# Patient Record
Sex: FEMALE | Race: ASIAN | NOT HISPANIC OR LATINO | Employment: UNEMPLOYED | URBAN - METROPOLITAN AREA
[De-identification: names, ages, dates, MRNs, and addresses within clinical notes are randomized per-mention and may not be internally consistent; named-entity substitution may affect disease eponyms.]

---

## 2017-01-03 ENCOUNTER — TELEPHONE (OUTPATIENT)
Dept: OBGYN | Facility: CLINIC | Age: 32
End: 2017-01-03

## 2017-01-03 NOTE — TELEPHONE ENCOUNTER
Patient called because she had her c section ans is experiencing vaginal bleeding since Sunday and does not know what to do

## 2017-01-03 NOTE — TELEPHONE ENCOUNTER
Called pt back   Pt had stopped bleeding and started bleeding   Advised pt that its normal. Advised pt if starts soaking through a pad every hour then she needs to go to the ER  Pt verbalized understanding

## 2017-01-04 ENCOUNTER — POST PARTUM (OUTPATIENT)
Dept: OBGYN | Facility: CLINIC | Age: 32
End: 2017-01-04
Payer: COMMERCIAL

## 2017-01-04 VITALS — WEIGHT: 177 LBS | SYSTOLIC BLOOD PRESSURE: 106 MMHG | DIASTOLIC BLOOD PRESSURE: 72 MMHG | BODY MASS INDEX: 28.58 KG/M2

## 2017-01-04 DIAGNOSIS — Z51.89 VISIT FOR WOUND CHECK: ICD-10-CM

## 2017-01-04 PROCEDURE — 90050 PR POSTPARTUM VISIT: CPT | Performed by: OBSTETRICS & GYNECOLOGY

## 2017-01-04 NOTE — MR AVS SNAPSHOT
Mihai Nazario Dhesi   2017 1:45 PM   Post Partum   MRN: [id number]    Department:  [facility]   Dept Phone:  [phone]    Description:  Female : 1985   Provider:  Alma Centeno M.D.           Reason for Visit     Postpartum care c/s check      Allergies as of 2017     No Known Allergies      Vital Signs     Blood Pressure Weight Breastfeeding? Smoking Status          106/72 mmHg 80.287 kg (177 lb) Yes Never Smoker         Basic Information     Date Of Birth Sex Race Ethnicity Preferred Language    1985 Female  Non- English      Health Maintenance        Date Due Completion Dates    PAP SMEAR 2019    IMM DTaP/Tdap/Td Vaccine (2 - Td) 2026            Current Immunizations     Influenza Vaccine Quad Inj (Pf) 2016 11:30 AM    MMR/Varicella Combined Vaccine  Incomplete    Tdap Vaccine  Incomplete, 2016 11:30 AM      Below and/or attached are the medications your provider expects you to take. Review all of your home medications and newly ordered medications with your provider and/or pharmacist. Follow medication instructions as directed by your provider and/or pharmacist. Please keep your medication list with you and share with your provider. Update the information when medications are discontinued, doses are changed, or new medications (including over-the-counter products) are added; and carry medication information at all times in the event of emergency situations     Allergies:  No Known Allergies          Medications  Valid as of: 2017 -  2:18 PM    Generic Name Brand Name Tablet Size Instructions for use    Docusate Sodium (Cap)  MG Take 100 mg by mouth 2 times a day as needed for Constipation.        Ferrous Sulfate (Tab) ferrous sulfate 325 (65 FE) MG Take 1 Tab by mouth every day.        Ibuprofen (Tab) MOTRIN 600 MG Take 1 Tab by mouth every 6 hours as needed (cramping).        Oxycodone-Acetaminophen (Tab)  PERCOCET 5-325 MG Take 1 Tab by mouth every four hours as needed for Moderate Pain ((Pain Scale 4-6)).        Prenatal MV-Min-Fe Fum-FA-DHA   Take  by mouth.        .                 Medicines prescribed today were sent to:     [id number] - [place], [place] - [address]    [address] [place] [address]    Phone: [phone] Fax: [phone]    Open 24 Hours?: No      Medication refill instructions:       If your prescription bottle indicates you have medication refills left, it is not necessary to call your provider’s office. Please contact your pharmacy and they will refill your medication.    If your prescription bottle indicates you do not have any refills left, you may request refills at any time through one of the following ways: The online Offerboard system (except Urgent Care), by calling your provider’s office, or by asking your pharmacy to contact your provider’s office with a refill request. Medication refills are processed only during regular business hours and may not be available until the next business day. Your provider may request additional information or to have a follow-up visit with you prior to refilling your medication.   *Please Note: Medication refills are assigned a new Rx number when refilled electronically. Your pharmacy may indicate that no refills were authorized even though a new prescription for the same medication is available at the pharmacy. Please request the medicine by name with the pharmacy before contacting your provider for a refill.           Offerboard Access Code: Activation code not generated  Current Offerboard Status: Active

## 2017-01-05 NOTE — PROGRESS NOTES
Mihai Brandt Is a 31 y.o.  status post  delivery on 16. Patient is here today for an incision check. Currently the patient is without complaints.  She reports Normal  BM.  Normal  appetite without nausea and vomiting. She denies fever. Pain is under good control.    /72 mmHg  Wt 80.287 kg (177 lb)  Breastfeeding? Yes  ABD soft, NT, ND.  Incision well healed, dry and intact      Assessment and plan  Status post  delivery  No complications incision healing well  Followup in 4 weeks for final postpartum checkup

## 2017-02-08 ENCOUNTER — POST PARTUM (OUTPATIENT)
Dept: OBGYN | Facility: CLINIC | Age: 32
End: 2017-02-08
Payer: COMMERCIAL

## 2017-02-08 VITALS — WEIGHT: 175 LBS | BODY MASS INDEX: 28.26 KG/M2 | SYSTOLIC BLOOD PRESSURE: 100 MMHG | DIASTOLIC BLOOD PRESSURE: 60 MMHG

## 2017-02-08 DIAGNOSIS — F41.9 ANXIETY: ICD-10-CM

## 2017-02-08 PROCEDURE — 90050 PR POSTPARTUM VISIT: CPT | Performed by: OBSTETRICS & GYNECOLOGY

## 2017-02-08 NOTE — MR AVS SNAPSHOT
Mihai Nazario Dhesi   2017 2:00 PM   Post Partum   MRN: [id number]    Department:  [facility]   Dept Phone:  [phone]    Description:  Female : 1985   Provider:  Alma Centeno M.D.           Reason for Visit     Postpartum care           Allergies as of 2017     No Known Allergies      Vital Signs     Blood Pressure Weight Breastfeeding? Smoking Status          100/60 mmHg 79.379 kg (175 lb) Yes Never Smoker         Basic Information     Date Of Birth Sex Race Ethnicity Preferred Language    1985 Female  Non- English      Health Maintenance        Date Due Completion Dates    PAP SMEAR 2019    IMM DTaP/Tdap/Td Vaccine (2 - Td) 2026            Current Immunizations     Influenza Vaccine Quad Inj (Pf) 2016 11:30 AM    MMR/Varicella Combined Vaccine  Incomplete    Tdap Vaccine  Incomplete, 2016 11:30 AM      Below and/or attached are the medications your provider expects you to take. Review all of your home medications and newly ordered medications with your provider and/or pharmacist. Follow medication instructions as directed by your provider and/or pharmacist. Please keep your medication list with you and share with your provider. Update the information when medications are discontinued, doses are changed, or new medications (including over-the-counter products) are added; and carry medication information at all times in the event of emergency situations     Allergies:  No Known Allergies          Medications  Valid as of: 2017 -  2:36 PM    Generic Name Brand Name Tablet Size Instructions for use    Docusate Sodium (Cap)  MG Take 100 mg by mouth 2 times a day as needed for Constipation.        Ferrous Sulfate (Tab) ferrous sulfate 325 (65 FE) MG Take 1 Tab by mouth every day.        Ibuprofen (Tab) MOTRIN 600 MG Take 1 Tab by mouth every 6 hours as needed (cramping).        Oxycodone-Acetaminophen (Tab)  PERCOCET 5-325 MG Take 1 Tab by mouth every four hours as needed for Moderate Pain ((Pain Scale 4-6)).        Prenatal MV-Min-Fe Fum-FA-DHA   Take  by mouth.        .                 Medicines prescribed today were sent to:     [facility] - [place], [place] - [address]    [address] [place] [address]    Phone: [phone] Fax: [phone]    Open 24 Hours?: No      Medication refill instructions:       If your prescription bottle indicates you have medication refills left, it is not necessary to call your provider’s office. Please contact your pharmacy and they will refill your medication.    If your prescription bottle indicates you do not have any refills left, you may request refills at any time through one of the following ways: The online TappTime system (except Urgent Care), by calling your provider’s office, or by asking your pharmacy to contact your provider’s office with a refill request. Medication refills are processed only during regular business hours and may not be available until the next business day. Your provider may request additional information or to have a follow-up visit with you prior to refilling your medication.   *Please Note: Medication refills are assigned a new Rx number when refilled electronically. Your pharmacy may indicate that no refills were authorized even though a new prescription for the same medication is available at the pharmacy. Please request the medicine by name with the pharmacy before contacting your provider for a refill.           TappTime Access Code: Activation code not generated  Current TappTime Status: Active

## 2017-02-08 NOTE — PROGRESS NOTES
Mihai Brandt Is a 31 y.o.  status post  delivery on 16. Patient is here today for a postpartum check. Currently the patient c/o some intermittent abdominal pain. Vaginal bleeding has stopped. She also c/o significant anxiety and mood swings.  No suicidal or homicidal ideations.  Breast feeding. She reports Normal  BM.  Normal  appetite without nausea and vomiting. She denies fever. Pain is under good control with motrin.     /60 mmHg  Wt 79.379 kg (175 lb)  Breastfeeding? Yes GENERAL: Alert, in no apparent distress  Appropriate affect, intact insight and judgement.  Appears anxious, almost tearful.  ABDOMEN: Soft, nontender, nondistended.  No palpable masses.  No rebound or guarding.  No inguinal lymphadenopathy.  INCISION - well healed.     GENITOURINARY:  Normal external genitalia, no lesions.  Normal anus and perineum.    Speculum exam attempted - pt states she was in pain as soon as I touched her and asked me to stop exam.     Assessment and plan  Status post  delivery  No complications incision healing well  Anxiety - Zoloft 50 mg qd.  Advised minimal effects on breast feeding.    F/U 1 month.

## 2018-05-13 ENCOUNTER — OFFICE VISIT (OUTPATIENT)
Dept: URGENT CARE | Facility: CLINIC | Age: 33
End: 2018-05-13
Payer: COMMERCIAL

## 2018-05-13 VITALS
RESPIRATION RATE: 16 BRPM | HEIGHT: 66 IN | DIASTOLIC BLOOD PRESSURE: 66 MMHG | HEART RATE: 96 BPM | SYSTOLIC BLOOD PRESSURE: 100 MMHG | OXYGEN SATURATION: 99 % | BODY MASS INDEX: 22.02 KG/M2 | TEMPERATURE: 99 F | WEIGHT: 137 LBS

## 2018-05-13 DIAGNOSIS — J02.0 STREP PHARYNGITIS: ICD-10-CM

## 2018-05-13 LAB
INT CON NEG: NORMAL
INT CON POS: NORMAL
S PYO AG THROAT QL: POSITIVE

## 2018-05-13 PROCEDURE — 99204 OFFICE O/P NEW MOD 45 MIN: CPT | Performed by: PHYSICIAN ASSISTANT

## 2018-05-13 PROCEDURE — 87880 STREP A ASSAY W/OPTIC: CPT | Performed by: PHYSICIAN ASSISTANT

## 2018-05-13 RX ORDER — AMOXICILLIN 875 MG/1
875 TABLET, COATED ORAL 2 TIMES DAILY
Qty: 20 TAB | Refills: 0 | Status: SHIPPED | OUTPATIENT
Start: 2018-05-13 | End: 2018-05-23

## 2018-05-13 RX ORDER — DIPHENHYDRAMINE HYDROCHLORIDE AND LIDOCAINE HYDROCHLORIDE AND ALUMINUM HYDROXIDE AND MAGNESIUM HYDRO
5 KIT EVERY 6 HOURS PRN
Qty: 100 ML | Refills: 0 | Status: SHIPPED | OUTPATIENT
Start: 2018-05-13 | End: 2019-01-04

## 2018-05-13 RX ORDER — ACETAMINOPHEN 500 MG
500-1000 TABLET ORAL EVERY 6 HOURS PRN
COMMUNITY
End: 2019-01-04

## 2018-05-13 ASSESSMENT — ENCOUNTER SYMPTOMS
NECK PAIN: 1
DIARRHEA: 0
TINGLING: 0
SPUTUM PRODUCTION: 0
MYALGIAS: 1
PALPITATIONS: 0
COUGH: 0
SORE THROAT: 1
SWOLLEN GLANDS: 1
WHEEZING: 0
CHILLS: 1
HEADACHES: 1
VOMITING: 0
NAUSEA: 0
SENSORY CHANGE: 0
FEVER: 1
FOCAL WEAKNESS: 0
SHORTNESS OF BREATH: 0
SINUS PAIN: 0
ABDOMINAL PAIN: 0

## 2018-05-13 NOTE — PROGRESS NOTES
Subjective:      Mihai Brandt is a 32 y.o. female who presents with Pharyngitis (x4 days. Sore throat, fever, body aches, neck pain. )            Pharyngitis    This is a new problem. Episode onset: 2 days. The problem has been unchanged. Maximum temperature: subjective  Associated symptoms include headaches, neck pain and swollen glands. Pertinent negatives include no abdominal pain, congestion, coughing, diarrhea, drooling, ear discharge, ear pain, plugged ear sensation, shortness of breath or vomiting. She has tried acetaminophen for the symptoms. The treatment provided mild relief.     Past Medical History:   Diagnosis Date   • Anesthesia     N/V   • Indigestion    • [place] product of in vitro fertilization (IVF) pregnancy        • Pain     lower back pain   • PCOD (polycystic ovarian disease)     HAD LAPROSCOPIC SURGERY    • Pregnant        Past Surgical History:   Procedure Laterality Date   • REPEAT C SECTION  2016    Procedure: REPEAT C SECTION;  Surgeon: Alma Centeno M.D.;  Location: LABOR AND DELIVERY;  Service:    • PRIMARY C SECTION      2015       Family History   Problem Relation Age of Onset   • Heart Disease Father    • Hypertension Father        No Known Allergies    Medications, Allergies, and current problem list reviewed today in Epic      Review of Systems   Constitutional: Positive for chills, fever and malaise/fatigue.   HENT: Positive for sore throat. Negative for congestion, drooling, ear discharge, ear pain and sinus pain.    Respiratory: Negative for cough, sputum production, shortness of breath and wheezing.    Cardiovascular: Negative for chest pain, palpitations and leg swelling.   Gastrointestinal: Negative for abdominal pain, diarrhea, nausea and vomiting.   Musculoskeletal: Positive for myalgias and neck pain.   Skin: Negative for rash.   Neurological: Positive for headaches. Negative for tingling, sensory change and focal weakness.     All other systems  "reviewed and are negative.          Objective:     /66   Pulse 96   Temp 37.2 °C (99 °F)   Resp 16   Ht 1.676 m (5' 6\")   Wt 62.1 kg (137 lb)   SpO2 99%   Breastfeeding? No   BMI 22.11 kg/m²      Physical Exam   Constitutional: She is oriented to person, place, and time. She appears well-developed and well-nourished. No distress.   HENT:   Head: Normocephalic and atraumatic.   Right Ear: Tympanic membrane, external ear and ear canal normal.   Left Ear: Tympanic membrane, external ear and ear canal normal.   Nose: Nose normal.   Mouth/Throat: Uvula is midline. Posterior oropharyngeal erythema present. No oropharyngeal exudate. Tonsils are 1+ on the right. Tonsils are 1+ on the left. Tonsillar exudate.   Eyes: Conjunctivae are normal.   Neck: Neck supple.   Cardiovascular: Normal rate, regular rhythm and normal heart sounds.  Exam reveals no gallop and no friction rub.    No murmur heard.  Pulmonary/Chest: Effort normal and breath sounds normal. No respiratory distress. She has no wheezes. She has no rales.   Lymphadenopathy:     She has cervical adenopathy (mild cervical adenopathy bilaterally ).   Neurological: She is alert and oriented to person, place, and time. No cranial nerve deficit.   Skin: Skin is warm and dry. No rash noted.   Psychiatric: She has a normal mood and affect. Her behavior is normal. Judgment and thought content normal.               Assessment/Plan:     1. Strep pharyngitis  POCT Rapid Strep A- positive     amoxicillin (AMOXIL) 875 MG tablet    DPH-Lido-AlHydr-MgHydr-Simeth (MAGIC MOUTHWASH BLM) Suspension         Current Outpatient Prescriptions:     •  amoxicillin (AMOXIL) 875 MG tablet, Take 1 Tab by mouth 2 times a day for 10 days., Disp: 20 Tab, Rfl: 0  •  DPH-Lido-AlHydr-MgHydr-Simeth (MAGIC MOUTHWASH BLM) Suspension, Take 5 mL by mouth every 6 hours as needed. 1:1:1 ratio. Swish and SPIT. KIT is OK if that is how the pharmacy dispenses this medication, Disp: 100 mL, Rfl: " 0     Differential diagnoses, Supportive care, and indications for immediate follow-up discussed with patient.   Instructed to return to clinic or nearest emergency department for any change in condition, further concerns, or worsening of symptoms.    The patient demonstrated a good understanding and agreed with the treatment plan.    Karla Chandra P.A.-C.

## 2018-06-07 ENCOUNTER — OFFICE VISIT (OUTPATIENT)
Dept: URGENT CARE | Facility: CLINIC | Age: 33
End: 2018-06-07
Payer: COMMERCIAL

## 2018-06-07 VITALS
DIASTOLIC BLOOD PRESSURE: 82 MMHG | WEIGHT: 137 LBS | HEART RATE: 75 BPM | BODY MASS INDEX: 22.02 KG/M2 | RESPIRATION RATE: 16 BRPM | HEIGHT: 66 IN | OXYGEN SATURATION: 100 % | TEMPERATURE: 98.5 F | SYSTOLIC BLOOD PRESSURE: 128 MMHG

## 2018-06-07 DIAGNOSIS — R30.0 DYSURIA: Primary | ICD-10-CM

## 2018-06-07 LAB
APPEARANCE UR: NORMAL
BILIRUB UR STRIP-MCNC: NORMAL MG/DL
COLOR UR AUTO: YELLOW
GLUCOSE UR STRIP.AUTO-MCNC: NORMAL MG/DL
KETONES UR STRIP.AUTO-MCNC: NORMAL MG/DL
LEUKOCYTE ESTERASE UR QL STRIP.AUTO: NORMAL
NITRITE UR QL STRIP.AUTO: NORMAL
PH UR STRIP.AUTO: 6 [PH] (ref 5–8)
PROT UR QL STRIP: NORMAL MG/DL
RBC UR QL AUTO: NORMAL
SP GR UR STRIP.AUTO: 1.03
UROBILINOGEN UR STRIP-MCNC: NORMAL MG/DL

## 2018-06-07 PROCEDURE — 81002 URINALYSIS NONAUTO W/O SCOPE: CPT | Performed by: PHYSICIAN ASSISTANT

## 2018-06-07 PROCEDURE — 99213 OFFICE O/P EST LOW 20 MIN: CPT | Performed by: PHYSICIAN ASSISTANT

## 2018-06-07 RX ORDER — LEVOTHYROXINE SODIUM 88 UG/1
88 TABLET ORAL
COMMUNITY
End: 2019-07-29 | Stop reason: SDUPTHER

## 2018-06-07 RX ORDER — PHENAZOPYRIDINE HYDROCHLORIDE 200 MG/1
200 TABLET, FILM COATED ORAL 3 TIMES DAILY
Qty: 6 TAB | Refills: 0 | Status: SHIPPED | OUTPATIENT
Start: 2018-06-07 | End: 2018-06-09

## 2018-06-07 RX ORDER — UBIDECARENONE 75 MG
100 CAPSULE ORAL DAILY
COMMUNITY
End: 2019-10-22

## 2018-06-07 RX ORDER — NITROFURANTOIN 25; 75 MG/1; MG/1
100 CAPSULE ORAL EVERY 12 HOURS
Qty: 10 CAP | Refills: 0 | Status: SHIPPED | OUTPATIENT
Start: 2018-06-07 | End: 2018-06-12

## 2018-06-07 ASSESSMENT — PATIENT HEALTH QUESTIONNAIRE - PHQ9: CLINICAL INTERPRETATION OF PHQ2 SCORE: 0

## 2018-06-07 NOTE — PROGRESS NOTES
Subjective:      PT is a 32 y.o. female who presents with Dysuria (urine cloudy, pressure, frequency, x5days, past 2days hard to sleep)            HPI  PT comes into the UC with a chief complaint of dysuria, burning on urination, urgency, frequency, and bladder pressure and has not noticed blood in her urine x 2 days. PT denies fevers or chills, CP, SOB, NVD, paresthesias, headaches, dizziness, change in vision, hives, or joint pain. PT states the pain is a 5/10 with burning upon urination, aching in nature and worse at night. She states she has not taken any RX meds for this issue. She denies flank or back pain as well. The pt's medication list, problem list, and allergies have been evaluated and reviewed during today's visit.        PMH:  Past Medical History:   Diagnosis Date   • Anesthesia     N/V   • Indigestion    • [place] product of in vitro fertilization (IVF) pregnancy        • Pain     lower back pain   • PCOD (polycystic ovarian disease)     HAD LAPROSCOPIC SURGERY    • Pregnant        PSH:  Past Surgical History:   Procedure Laterality Date   • REPEAT C SECTION  2016    Procedure: REPEAT C SECTION;  Surgeon: Alma Centeno M.D.;  Location: LABOR AND DELIVERY;  Service:    • PRIMARY C SECTION      2015       Fam Hx:    family history includes Heart Disease in her father; Hypertension in her father.  Family Status   Relation Status   • Mother Alive   • Father Alive       Soc HX:  Social History     Social History   • Marital status:      Spouse name: N/A   • Number of children: N/A   • Years of education: N/A     Occupational History   • Not on file.     Social History Main Topics   • Smoking status: Never Smoker   • Smokeless tobacco: Never Used   • Alcohol use No   • Drug use: No   • Sexual activity: Yes     Partners: Male     Other Topics Concern   • Not on file     Social History Narrative   • No narrative on file         Medications:    Current Outpatient Prescriptions:   •   nitrofurantoin monohydr macro (MACROBID) 100 MG Cap, Take 1 Cap by mouth every 12 hours for 5 days., Disp: 10 Cap, Rfl: 0  •  phenazopyridine (PYRIDIUM) 200 MG Tab, Take 1 Tab by mouth 3 times a day for 2 days., Disp: 6 Tab, Rfl: 0  •  acetaminophen (TYLENOL) 500 MG Tab, Take 500-1,000 mg by mouth every 6 hours as needed., Disp: , Rfl:   •  DPH-Lido-AlHydr-MgHydr-Simeth (MAGIC MOUTHWASH BLM) Suspension, Take 5 mL by mouth every 6 hours as needed. 1:1:1 ratio. Swish and SPIT. KIT is OK if that is how the pharmacy dispenses this medication, Disp: 100 mL, Rfl: 0  •  sertraline (ZOLOFT) 50 MG Tab, Take 1 Tab by mouth every day., Disp: 30 Tab, Rfl: 1  •  oxycodone-acetaminophen (PERCOCET) 5-325 MG Tab, Take 1 Tab by mouth every four hours as needed for Moderate Pain ((Pain Scale 4-6))., Disp: 30 Tab, Rfl: 0  •  ibuprofen (MOTRIN) 600 MG Tab, Take 1 Tab by mouth every 6 hours as needed (cramping)., Disp: 30 Tab, Rfl: 2  •  docusate sodium 100 MG Cap, Take 100 mg by mouth 2 times a day as needed for Constipation., Disp: 60 Cap, Rfl: 2  •  ferrous sulfate 325 (65 FE) MG tablet, Take 1 Tab by mouth every day., Disp: 30 Tab, Rfl: 2  •  Prenatal MV-Min-Fe Fum-FA-DHA (PRENATAL 1 PO), Take  by mouth., Disp: , Rfl:       Allergies:  Patient has no known allergies.    ROS  Review of Systems   Constitutional: Negative for fever, chills and malaise/fatigue.   HENT: Negative for congestion and sore throat.    Eyes: Negative for blurred vision, double vision and photophobia.   Respiratory: Negative for cough and shortness of breath.    Cardiovascular: Negative for chest pain and palpitations.   Gastrointestinal: Negative for nausea, vomiting, abdominal pain, diarrhea and constipation.   Genitourinary: Positive for dysuria, urgency and frequency.   Musculoskeletal: Negative for joint pain and myalgias.   Skin: Negative for rash.   Neurological: Negative for dizziness, tingling and headaches.   Endo/Heme/Allergies: Does not bruise/bleed  "easily.   Psychiatric/Behavioral: Negative for depression. The patient is not nervous/anxious.           Objective:   Encounter Vitals  Standard Vitals  Vitals  Blood Pressure: 128/82  Temperature: 36.9 °C (98.5 °F)  Pulse: 75  Respiration: 16  Pulse Oximetry: 100 %  Height: 167.6 cm (5' 6\")  Weight: 62.1 kg (137 lb)      Physical Exam      Physical Exam   Constitutional: She is oriented to person, place, and time. She appears well-developed and well-nourished. No distress.   HENT:   Head: Normocephalic and atraumatic.   Right Ear: External ear normal.   Left Ear: External ear normal.   Nose: Nose normal.   Mouth/Throat: Oropharynx is clear and moist. No oropharyngeal exudate.   Eyes: Conjunctivae normal and EOM are normal. Pupils are equal, round, and reactive to light.   Neck: Normal range of motion. Neck supple. No thyromegaly present.   Cardiovascular: Normal rate, regular rhythm, normal heart sounds and intact distal pulses.  Exam reveals no gallop and no friction rub.    No murmur heard.  Pulmonary/Chest: Effort normal and breath sounds normal. No respiratory distress. She has no wheezes. She has no rales. She exhibits no tenderness.   Abdominal: Soft. Bowel sounds are normal. She exhibits no distension and no mass. There is no tenderness. There is no rebound and no guarding.   Genitourinary:        Pt deferred   Musculoskeletal: Normal range of motion. She exhibits no edema and no tenderness.   Lymphadenopathy:     She has no cervical adenopathy.   Neurological: She is alert and oriented to person, place, and time. She has normal reflexes. No cranial nerve deficit.   Skin: Skin is warm and dry. No rash noted. No erythema.   Psychiatric: She has a normal mood and affect. Her behavior is normal. Judgment and thought content normal.          Assessment/Plan:     1. Dysuria    - POCT Urinalysis-->LEUKS AND BLOOD  - Urine Culture; Future  - nitrofurantoin monohydr macro (MACROBID) 100 MG Cap; Take 1 Cap by mouth " every 12 hours for 5 days.  Dispense: 10 Cap; Refill: 0  - phenazopyridine (PYRIDIUM) 200 MG Tab; Take 1 Tab by mouth 3 times a day for 2 days.  Dispense: 6 Tab; Refill: 0    Rest, fluids encouraged.  AVS with medical info given.  Pt was in full understanding and agreement with the plan.  Follow-up as needed if symptoms worsen or fail to improve.

## 2018-06-12 ENCOUNTER — TELEPHONE (OUTPATIENT)
Dept: URGENT CARE | Facility: CLINIC | Age: 33
End: 2018-06-12

## 2018-06-12 NOTE — TELEPHONE ENCOUNTER
Patient states that she has finished her antibiotics and is not feeling better. She states that she needs a higher dose of antibiotics every times she gets put on any and would like just like a higher dose. Has been informed to come in be reevaluated but she would like you to send something in for her instead.

## 2018-06-19 ENCOUNTER — TELEPHONE (OUTPATIENT)
Dept: URGENT CARE | Facility: CLINIC | Age: 33
End: 2018-06-19

## 2018-06-19 DIAGNOSIS — N30.01 ACUTE CYSTITIS WITH HEMATURIA: Primary | ICD-10-CM

## 2018-06-19 RX ORDER — CIPROFLOXACIN 500 MG/1
500 TABLET, FILM COATED ORAL 2 TIMES DAILY
Qty: 14 TAB | Refills: 0 | Status: SHIPPED | OUTPATIENT
Start: 2018-06-19 | End: 2018-06-26

## 2018-06-19 NOTE — TELEPHONE ENCOUNTER
Called and left message with pt about urine culture results which came back positive for E.coli.   Pt notes she is still having symptoms. I called in Cipro for the pt to her pharmacy.  Encouraged Pt to call back with questions.  Jas Landin PA-C

## 2018-11-19 ENCOUNTER — OFFICE VISIT (OUTPATIENT)
Dept: URGENT CARE | Facility: CLINIC | Age: 33
End: 2018-11-19
Payer: COMMERCIAL

## 2018-11-19 ENCOUNTER — HOSPITAL ENCOUNTER (OUTPATIENT)
Facility: MEDICAL CENTER | Age: 33
End: 2018-11-19
Attending: PHYSICIAN ASSISTANT
Payer: COMMERCIAL

## 2018-11-19 VITALS
HEIGHT: 66 IN | HEART RATE: 78 BPM | TEMPERATURE: 98.8 F | SYSTOLIC BLOOD PRESSURE: 124 MMHG | DIASTOLIC BLOOD PRESSURE: 80 MMHG | BODY MASS INDEX: 21.86 KG/M2 | OXYGEN SATURATION: 99 % | WEIGHT: 136 LBS

## 2018-11-19 DIAGNOSIS — N30.00 ACUTE CYSTITIS WITHOUT HEMATURIA: ICD-10-CM

## 2018-11-19 LAB
APPEARANCE UR: NORMAL
BILIRUB UR STRIP-MCNC: NEGATIVE MG/DL
COLOR UR AUTO: NORMAL
FORWARD REASON: SPWHY: NORMAL
FORWARDED TO LAB: SPWHR: NORMAL
GLUCOSE UR STRIP.AUTO-MCNC: NEGATIVE MG/DL
INT CON NEG: NORMAL
INT CON POS: NORMAL
KETONES UR STRIP.AUTO-MCNC: NEGATIVE MG/DL
LEUKOCYTE ESTERASE UR QL STRIP.AUTO: NORMAL
NITRITE UR QL STRIP.AUTO: NEGATIVE
PH UR STRIP.AUTO: 5 [PH] (ref 5–8)
POC URINE PREGNANCY TEST: NEGATIVE
PROT UR QL STRIP: NEGATIVE MG/DL
RBC UR QL AUTO: NORMAL
SP GR UR STRIP.AUTO: 1.02
SPECIMEN SENT: SPWT1: NORMAL
UROBILINOGEN UR STRIP-MCNC: 0.2 MG/DL

## 2018-11-19 PROCEDURE — 81025 URINE PREGNANCY TEST: CPT | Performed by: PHYSICIAN ASSISTANT

## 2018-11-19 PROCEDURE — 81002 URINALYSIS NONAUTO W/O SCOPE: CPT | Performed by: PHYSICIAN ASSISTANT

## 2018-11-19 PROCEDURE — 99214 OFFICE O/P EST MOD 30 MIN: CPT | Performed by: PHYSICIAN ASSISTANT

## 2018-11-19 RX ORDER — ACETAMINOPHEN 325 MG/1
650 TABLET ORAL
COMMUNITY
End: 2019-01-04

## 2018-11-19 RX ORDER — CIPROFLOXACIN 500 MG/1
500 TABLET, FILM COATED ORAL EVERY 12 HOURS
Qty: 14 TAB | Refills: 0 | Status: SHIPPED | OUTPATIENT
Start: 2018-11-19 | End: 2018-11-26

## 2018-11-19 ASSESSMENT — ENCOUNTER SYMPTOMS
COUGH: 0
DIARRHEA: 0
HEADACHES: 0
FEVER: 0
VOMITING: 0
CHILLS: 0
ABDOMINAL PAIN: 0
NAUSEA: 0
BACK PAIN: 0
FLANK PAIN: 0
SHORTNESS OF BREATH: 0
PALPITATIONS: 0

## 2018-11-19 NOTE — PROGRESS NOTES
Subjective:      Miahi Brandt is a 33 y.o. female who presents with UTI and Dysuria            Dysuria    This is a new problem. Episode onset: 3 days. The problem occurs every urination. The problem has been unchanged. The quality of the pain is described as burning. There has been no fever. She is sexually active. There is no history of pyelonephritis. Associated symptoms include frequency and urgency. Pertinent negatives include no chills, discharge, flank pain, hematuria, nausea, possible pregnancy or vomiting. She has tried increased fluids for the symptoms. Her past medical history is significant for recurrent UTIs (3rd episode this year). There is no history of kidney stones.       Past Medical History:   Diagnosis Date   • Anesthesia     N/V   • Indigestion    •  product of in vitro fertilization (IVF) pregnancy        • Pain     lower back pain   • PCOD (polycystic ovarian disease)     HAD LAPROSCOPIC SURGERY    • Pregnant        Past Surgical History:   Procedure Laterality Date   • REPEAT C SECTION  2016    Procedure: REPEAT C SECTION;  Surgeon: Alma Centeno M.D.;  Location: LABOR AND DELIVERY;  Service:    • PRIMARY C SECTION      2015       Family History   Problem Relation Age of Onset   • Heart Disease Father    • Hypertension Father        No Known Allergies    Medications, Allergies, and current problem list reviewed today in Epic      Review of Systems   Constitutional: Negative for chills, fever and malaise/fatigue.   Respiratory: Negative for cough and shortness of breath.    Cardiovascular: Negative for chest pain, palpitations and leg swelling.   Gastrointestinal: Negative for abdominal pain, diarrhea, nausea and vomiting.   Genitourinary: Positive for dysuria, frequency and urgency. Negative for flank pain and hematuria.   Musculoskeletal: Negative for back pain.   Skin: Negative for rash.   Neurological: Negative for headaches.     All other systems reviewed and  "are negative.        Objective:     /80 (BP Location: Right arm, Patient Position: Sitting, BP Cuff Size: Adult)   Pulse 78   Temp 37.1 °C (98.8 °F) (Temporal)   Ht 1.676 m (5' 6\")   Wt 61.7 kg (136 lb)   SpO2 99%   BMI 21.95 kg/m²      Physical Exam   Constitutional: She is oriented to person, place, and time. She appears well-developed and well-nourished. No distress.   HENT:   Head: Normocephalic and atraumatic.   Eyes: Conjunctivae are normal.   Cardiovascular: Normal rate, regular rhythm and normal heart sounds.  Exam reveals no gallop and no friction rub.    No murmur heard.  Pulmonary/Chest: Effort normal and breath sounds normal. No respiratory distress. She has no wheezes. She has no rales.   Abdominal: Soft. She exhibits no distension and no mass. There is no hepatosplenomegaly. There is tenderness (mild suprapubic TTP ). There is no rigidity, no rebound, no guarding and no CVA tenderness.   Neurological: She is alert and oriented to person, place, and time. No cranial nerve deficit.   Skin: Skin is warm and dry. No rash noted.   Psychiatric: She has a normal mood and affect. Her behavior is normal. Judgment and thought content normal.               Lab Results   Component Value Date/Time    POCCOLOR dark yellow 11/19/2018 11:34 AM    POCCOLOR Yellow 09/03/2016 01:24 PM    POCAPPEAR Cloudy 11/19/2018 11:34 AM    POCAPPEAR Clear 09/03/2016 01:24 PM    POCLEUKEST Large 11/19/2018 11:34 AM    POCLEUKEST Negative 09/03/2016 01:24 PM    POCNITRITE negative 11/19/2018 11:34 AM    POCNITRITE Negative 09/03/2016 01:24 PM    POCUROBILIGE 0.2 11/19/2018 11:34 AM    POCPROTEIN negative 11/19/2018 11:34 AM    POCPROTEIN Negative 09/03/2016 01:24 PM    POCURPH 5.0 11/19/2018 11:34 AM    POCURPH 7.0 09/03/2016 01:24 PM    POCBLOOD trace 11/19/2018 11:34 AM    POCBLOOD Negative 09/03/2016 01:24 PM    POCSPGRV 1.025 11/19/2018 11:34 AM    POCSPGRV 1.020 09/03/2016 01:24 PM    POCKETONES negative 11/19/2018 " 11:34 AM    POCKETONES Negative 09/03/2016 01:24 PM    POCBILIRUBIN negative 11/19/2018 11:34 AM    POCGLUCUA negative 11/19/2018 11:34 AM    POCGLUCUA Negative 09/03/2016 01:24 PM        poct pregnancy- negative    Assessment/Plan:     1. Acute cystitis without hematuria  POCT Urinalysis    POCT PREGNANCY    Urine Culture    ciprofloxacin (CIPRO) 500 MG Tab         Patient's last UTI did not resolve with Macrobid. Patient is requesting another RX for Cipro.    Current Outpatient Prescriptions:   •  ciprofloxacin (CIPRO) 500 MG Tab, Take 1 Tab by mouth every 12 hours for 7 days., Disp: 14 Tab, Rfl: 0       Differential diagnoses, Supportive care, and indications for immediate follow-up discussed with patient.   Instructed to return to clinic or nearest emergency department for any change in condition, further concerns, or worsening of symptoms.    The patient demonstrated a good understanding and agreed with the treatment plan.    Karla Chandra P.A.-C.

## 2019-01-04 ENCOUNTER — OFFICE VISIT (OUTPATIENT)
Dept: INTERNAL MEDICINE | Facility: MEDICAL CENTER | Age: 34
End: 2019-01-04
Payer: COMMERCIAL

## 2019-01-04 VITALS
DIASTOLIC BLOOD PRESSURE: 66 MMHG | OXYGEN SATURATION: 98 % | SYSTOLIC BLOOD PRESSURE: 106 MMHG | WEIGHT: 134 LBS | HEIGHT: 65 IN | BODY MASS INDEX: 22.33 KG/M2 | HEART RATE: 82 BPM | TEMPERATURE: 98.8 F

## 2019-01-04 DIAGNOSIS — Z00.00 HEALTH CARE MAINTENANCE: ICD-10-CM

## 2019-01-04 DIAGNOSIS — Z87.42 HISTORY OF PCOS: ICD-10-CM

## 2019-01-04 DIAGNOSIS — E03.9 HYPOTHYROIDISM, UNSPECIFIED TYPE: ICD-10-CM

## 2019-01-04 DIAGNOSIS — R53.83 FATIGUE, UNSPECIFIED TYPE: ICD-10-CM

## 2019-01-04 PROCEDURE — 99204 OFFICE O/P NEW MOD 45 MIN: CPT | Mod: GC | Performed by: INTERNAL MEDICINE

## 2019-01-04 ASSESSMENT — PAIN SCALES - GENERAL: PAINLEVEL: NO PAIN

## 2019-01-04 ASSESSMENT — PATIENT HEALTH QUESTIONNAIRE - PHQ9: CLINICAL INTERPRETATION OF PHQ2 SCORE: 0

## 2019-01-04 NOTE — PATIENT INSTRUCTIONS
Vitamin B12 Deficiency  Vitamin B12 deficiency occurs when the body does not have enough vitamin B12. Vitamin B12 is an important vitamin. The body needs vitamin B12:  · To make red blood cells.  · To make DNA. This is the genetic material inside cells.  · To help the nerves work properly so they can carry messages from the brain to the body.  Vitamin B12 deficiency can cause various health problems, such as a low red blood cell count (anemia) or nerve damage.  What are the causes?  This condition may be caused by:  · Not eating enough foods that contain vitamin B12.  · Not having enough stomach acid and digestive fluids to properly absorb vitamin B12 from the food that you eat.  · Certain digestive system diseases that make it hard to absorb vitamin B12. These diseases include Crohn disease, chronic pancreatitis, and cystic fibrosis.  · Pernicious anemia. This is a condition in which the body does not make enough of a protein (intrinsic factor), resulting in too few red blood cells.  · Having a surgery in which part of the stomach or small intestine is removed.  · Taking certain medicines that make it hard for the body to absorb vitamin B12. These medicines include:  ¨ Heartburn medicine (antacids and proton pump inhibitors).  ¨ An antibiotic medicine called neomycin.  ¨ Some medicines that are used to treat diabetes, tuberculosis, gout, or high cholesterol.  What increases the risk?  The following factors may make you more likely to develop a B12 deficiency:  · Being older than age 50.  · Eating a vegetarian or vegan diet, especially while you are pregnant.  · Eating a poor diet while you are pregnant.  · Taking certain drugs.  · Having alcoholism.  What are the signs or symptoms?  In some cases, there are no symptoms of this condition. If the condition leads to anemia or nerve damage, various symptoms can occur, such as:  · Weakness.  · Fatigue.  · Loss of appetite.  · Weight loss.  · Numbness or tingling in your  hands and feet.  · Redness and burning of the tongue.  · Confusion or memory problems.  · Depression.  · Sensory problems, such as color blindness, ringing in the ears, or loss of taste.  · Diarrhea or constipation.  · Trouble walking.  If anemia is severe, symptoms can include:  · Shortness of breath.  · Dizziness.  · Rapid heart rate (tachycardia).  How is this diagnosed?  This condition may be diagnosed with a blood test to measure the level of vitamin B12 in your blood. You may have other tests to help find the cause of your vitamin B12 deficiency. These tests may include:  · A complete blood count (CBC). This is a group of tests that measure certain characteristics of blood cells.  · A blood test to measure intrinsic factor.  · An endoscopy. In this procedure, a thin tube with a camera on the end is used to look into your stomach or intestines.  How is this treated?  Treatment for this condition depends on the cause. Common treatment options include:  · Changing your eating and drinking habits, such as:  ¨ Eating more foods that contain vitamin B12.  ¨ Drinking less alcohol or no alcohol.  · Taking vitamin B12 supplements. Your health care provider will tell you which dosage is best for you.  · Getting vitamin B12 injections.  Follow these instructions at home:  · Take supplements only as told by your health care provider. Follow the directions carefully.  · Get any injections that are prescribed by your health care provider.  Do not miss your appointments.  · Eat lots of healthy foods that contain vitamin B12. Ask your health care provider if you should work with a dietitian. Foods that contain vitamin B12 include:  ¨ Meat.  ¨ Meat from birds (poultry).  ¨ Fish.  ¨ Eggs.  ¨ Cereal and dairy products that are fortified. This means that vitamin B12 has been added to the food. Check the label on the package to see if the food is fortified.  · Do not abuse alcohol.  · Keep all follow-up visits as told by your  health care provider. This is important.  Contact a health care provider if:  · Your symptoms come back.  Get help right away if:  · You develop shortness of breath.  · You have chest pain.  · You become dizzy or you lose consciousness.  This information is not intended to replace advice given to you by your health care provider. Make sure you discuss any questions you have with your health care provider.  Document Released: 03/11/2013 Document Revised: 05/31/2017 Document Reviewed: 05/04/2016  [employer] Interactive Patient Education © 2017 [employer]  Hypothyroidism  Hypothyroidism is a disorder of the thyroid. The thyroid is a large gland that is located in the lower front of the neck. The thyroid releases hormones that control how the body works. With hypothyroidism, the thyroid does not make enough of these hormones.  What are the causes?  Causes of hypothyroidism may include:  · Viral infections.  · Pregnancy.  · Your own defense system (immune system) attacking your thyroid.  · Certain medicines.  · Birth defects.  · Past radiation treatments to your head or neck.  · Past treatment with radioactive iodine.  · Past surgical removal of part or all of your thyroid.  · Problems with the gland that is located in the center of your brain (pituitary).  What are the signs or symptoms?  Signs and symptoms of hypothyroidism may include:  · Feeling as though you have no energy (lethargy).  · Inability to tolerate cold.  · Weight gain that is not explained by a change in diet or exercise habits.  · Dry skin.  · Coarse hair.  · Menstrual irregularity.  · Slowing of thought processes.  · Constipation.  · Sadness or depression.  How is this diagnosed?  Your health care provider may diagnose hypothyroidism with blood tests and ultrasound tests.  How is this treated?  Hypothyroidism is treated with medicine that replaces the hormones that your body does not make. After you begin treatment, it may take several weeks for  symptoms to go away.  Follow these instructions at home:  · Take medicines only as directed by your health care provider.  · If you start taking any new medicines, tell your health care provider.  · Keep all follow-up visits as directed by your health care provider. This is important. As your condition improves, your dosage needs may change. You will need to have blood tests regularly so that your health care provider can watch your condition.  Contact a health care provider if:  · Your symptoms do not get better with treatment.  · You are taking thyroid replacement medicine and:  ¨ You sweat excessively.  ¨ You have tremors.  ¨ You feel anxious.  ¨ You lose weight rapidly.  ¨ You cannot tolerate heat.  ¨ You have emotional swings.  ¨ You have diarrhea.  ¨ You feel weak.  Get help right away if:  · You develop chest pain.  · You develop an irregular heartbeat.  · You develop a rapid heartbeat.  This information is not intended to replace advice given to you by your health care provider. Make sure you discuss any questions you have with your health care provider.  Document Released: 12/18/2006 Document Revised: 05/25/2017 Document Reviewed: 05/05/2015  [employer] Interactive Patient Education © 2017 [employer]

## 2019-01-04 NOTE — PROGRESS NOTES
New Patient to Establish    Reason to establish: due for labs    CC: establish care    HPI: 33-year-old female patient with past medical history of PCOS and hypothyroidism comes in to establish care.  Patient was in [place] and moved here.Had her last labs in [place] in 2017.    # PCOS: chronic history and initially had irregular periods, conceived through IVF first child and second child born later without any problems.Both C -section. Since birth of second child- menstrual cycles were heavy in the beginning but now have been regular. Followed up with GYN in the past. Currently not on any medications for this problem- not on OCP or metformin. Last pap smear last year states was negative.    # Hypothyroidism: History of 1 yr. Last labs in [place] in 2017 showed elevated TSH and since then has been on synthroid 88 mcg PO once daily. Patient stopped taking medication by herself about one month back. Patient has baseline fatigue since birth of both kids which is unchanged for the past 1 month. Denies chest pain, palpitations, dizziness, muscle pain/body aches, heat intolerance, diarrhea,constipation. Does report occasional cold intolerance and has been experiencing hair fall worsened within last few month.Prior all lab work patient brought with her and made a copy to scan to her chart.    Patient Active Problem List    Diagnosis Date Noted   • History of PCOS 2019   • Hypothyroidism 2019       Past Medical History:   Diagnosis Date   • Anemia    • Anesthesia     N/V   • Indigestion    •  product of in vitro fertilization (IVF) pregnancy        • Pain     lower back pain   • PCOD (polycystic ovarian disease)     HAD LAPROSCOPIC SURGERY    • Pregnant    • Thyroid disease        Current Outpatient Prescriptions   Medication Sig Dispense Refill   • Multiple Vitamin (MULTI-DAY VITAMINS PO) Take  by mouth.     • cyanocobalamin (VITAMIN B-12) 100 MCG Tab Take 100 mcg by mouth every day.      • levothyroxine (SYNTHROID) 88 MCG Tab Take 88 mcg by mouth Every morning on an empty stomach.       No current facility-administered medications for this visit.        Allergies as of 01/04/2019   • (No Known Allergies)       Social History     Social History   • Marital status:      Spouse name: N/A   • Number of children: N/A   • Years of education: N/A     Occupational History   • Not on file.     Social History Main Topics   • Smoking status: Never Smoker   • Smokeless tobacco: Never Used   • Alcohol use No   • Drug use: No   • Sexual activity: Yes     Partners: Male     Birth control/ protection: Condom     Other Topics Concern   • Not on file     Social History Narrative   • No narrative on file       Family History   Problem Relation Age of Onset   • Heart Disease Father    • Hypertension Father    • Heart Disease Paternal Grandfather        Past Surgical History:   Procedure Laterality Date   • REPEAT C SECTION  12/19/2016    Procedure: REPEAT C SECTION;  Surgeon: Alma Centeno M.D.;  Location: LABOR AND DELIVERY;  Service:    • PRIMARY C SECTION      07/2015       ROS: As per HPI. Additional pertinent symptoms as noted below.    Constitutional: Denies fever/chills/weight changes. +fatigue, hair fall and cold intolerance  Eyes: Denies changes/pain in vision  ENT: Denies sore throat/congestion/ear ache.   Cardiovascular: Denies chest pain /palpitations/edema.   Respiratory: Denies SOB/cough/PND/orthopnea  Abdomen: Denies difficulty swallowing/ diarrhea/constipation/abdominal pain/nausea/vomiting  Genitourinary: Normal urinary habits.   Musculo-skeletal: normal ambulation.Denies muscle or joint pains.  Skin: Denies rash/lesions.  Neurological: Denies weakness/tingling/numbness/headache  Psychological: good mood and cooperative. Denies anxiety /depression       /66 (BP Location: Right arm, Patient Position: Sitting, BP Cuff Size: Adult)   Pulse 82   Temp 37.1 °C (98.8 °F) (Temporal)   Ht  "1.651 m (5' 5\")   Wt 60.8 kg (134 lb)   LMP 12/21/2018 (Exact Date)   SpO2 98%   Breastfeeding? No   BMI 22.30 kg/m²     Physical Exam  General:  Alert and oriented, No apparent distress.    Eyes: Pupils equal and reactive. No scleral icterus.    Throat: Clear no erythema or exudates noted.    Neck: Supple. No lymphadenopathy noted. Thyroid not enlarged.    Lungs: Clear to auscultation and percussion bilaterally.    Cardiovascular: Regular rate and rhythm. No murmurs, rubs or gallops.    Abdomen:  Benign. No rebound or guarding noted.    Extremities: No clubbing, cyanosis, edema.    Skin: Clear. No rash or suspicious skin lesions noted.    Neurological: Oriented to time, place, and person .Cranial nerves intact. No motor/sensory deficits.Reflexes were normal and symmetrical in both upper and lower extremities     Musculoskeletal : NROM of all extremities. No tenderness or deformity noted.     Note: I have reviewed all pertinent labs and diagnostic tests associated with this visit with specific comments listed under the assessment and plan below      Assessment and Plan    1. Fatigue, unspecified type  - unclear etiology- likely due to hypothyroidism as patient stopped taking levothyroxine or anemia given her prior history in the past or vitamin d deficiency  - ordered CBC,CMP, vitamin D level, vitamin B12 level and TSH with reflex FT4 for next visit    2. Hypothyroidism, unspecified type  - chronic history  - was on levothyroxine 88 mcg PO once daily which patient stopped 1 months back  - symptomatic with cold intolerance, baseline unchanged fatigue, alopecia  - advise to restart taking levothyroxine  - ordered TSH with FT4 and lipid panel  - will continue to monitor    3. History of PCOS  Chronic history  Denies related symptoms of hirsutism, irregular menstrual cycles or insulin resistance  Followed up with GYN in the past  Currently regular periods and with normal amount of bleeding and unchanged number of " days  Not on any related medications    4. Health care maintenance  - had influenza vaccination- 11/7/18  - had tetanus vaccination- 11/4/16  - had pap smear last year -negative as per patient   - not a candidate for mammogram or colonoscopy or DEXA yet      Risk Assessment (discuss potential complications a function of chronic problems): spent 35 min's discussing plans of management and educating patient regarding her current condition and related complications    Complexity (discuss number of co-morbidities): discussed fatigue, hypothyroidism, preventive measures     Signed by: Joi Palacios M.D.

## 2019-07-29 ENCOUNTER — OFFICE VISIT (OUTPATIENT)
Dept: MEDICAL GROUP | Facility: LAB | Age: 34
End: 2019-07-29
Payer: COMMERCIAL

## 2019-07-29 VITALS
OXYGEN SATURATION: 98 % | SYSTOLIC BLOOD PRESSURE: 104 MMHG | DIASTOLIC BLOOD PRESSURE: 66 MMHG | HEART RATE: 70 BPM | BODY MASS INDEX: 22.79 KG/M2 | RESPIRATION RATE: 18 BRPM | HEIGHT: 65 IN | TEMPERATURE: 98.4 F | WEIGHT: 136.8 LBS

## 2019-07-29 DIAGNOSIS — E03.9 HYPOTHYROIDISM, UNSPECIFIED TYPE: ICD-10-CM

## 2019-07-29 DIAGNOSIS — Z87.42 HISTORY OF PCOS: ICD-10-CM

## 2019-07-29 PROCEDURE — 99214 OFFICE O/P EST MOD 30 MIN: CPT | Performed by: NURSE PRACTITIONER

## 2019-07-29 RX ORDER — LEVOTHYROXINE SODIUM 0.1 MG/1
100 TABLET ORAL
Qty: 30 TAB | Refills: 1 | Status: SHIPPED | OUTPATIENT
Start: 2019-07-29 | End: 2019-09-30 | Stop reason: SDUPTHER

## 2019-07-29 RX ORDER — LEVOTHYROXINE SODIUM 88 UG/1
88 TABLET ORAL
Qty: 30 TAB | Refills: 0 | Status: SHIPPED | OUTPATIENT
Start: 2019-07-29 | End: 2019-07-29 | Stop reason: SDUPTHER

## 2019-07-29 NOTE — ASSESSMENT & PLAN NOTE
This is a new problem to discuss.  Patient was seen in [place] and started on a medication for her thyroid.  In researching this medication it appears she was started on 88 mcg of levothyroxine.  She came back to the [place] and followed up with another provider who did not order Synthroid for her but did do labs in her TSH was still well over 3.  She continues to be symptomatic with constipation, hair falling skin changes and cold intolerance.  ROS is NEGATIVE for:anxiety/depression, chest pain/pressure, palpitations,  skin changes, diarrhea unexpected weight change.

## 2019-07-29 NOTE — PROGRESS NOTES
CC:Diagnoses of Hypothyroidism, unspecified type and History of PCOS were pertinent to this visit.    HISTORY OF PRESENT ILLNESS: Mihai Brandt is a 33 y.o. female established patient who presents today to discuss the following problems:    Hypothyroidism  This is a new problem to discuss.  Patient was seen in [place] and started on a medication for her thyroid.  In researching this medication it appears she was started on 88 mcg of levothyroxine.  She came back to the [place] and followed up with another provider who did not order Synthroid for her but did do labs in her TSH was still well over 3.  She continues to be symptomatic with constipation, hair falling skin changes and cold intolerance.  ROS is NEGATIVE for:anxiety/depression, chest pain/pressure, palpitations,  skin changes, diarrhea unexpected weight change.      History of PCOS  This is a chronic problem which patient has stated that is well controlled since having her children.  She did require IVF with her first child but then conceived naturally with her second child 7 months later.  She reports that since that time her periods have been very regular and she has not had any problems work been taking any medication for this.      Health Maintenance: Completed    Patient Active Problem List    Diagnosis Date Noted   • History of PCOS 01/04/2019   • Hypothyroidism 01/04/2019        Allergies:Patient has no known allergies.    Current Outpatient Prescriptions   Medication Sig Dispense Refill   • levothyroxine (SYNTHROID) 100 MCG Tab Take 1 Tab by mouth Every morning on an empty stomach. 30 Tab 1   • cyanocobalamin (VITAMIN B-12) 100 MCG Tab Take 100 mcg by mouth every day.     • Multiple Vitamin (MULTI-DAY VITAMINS PO) Take  by mouth.       No current facility-administered medications for this visit.        Social History   Substance Use Topics   • Smoking status: Never Smoker   • Smokeless tobacco: Never Used   • Alcohol use No     Social  "History     Social History Narrative   • No narrative on file       Family History   Problem Relation Age of Onset   • Heart Disease Father    • Hypertension Father    • Heart Disease Paternal Grandfather        ROS:     No fevers or weight loss  No headaches or sore throat  No chest pain or shortness of breath  No bowel changes  No lower extremity edema  No suicidal ideation or panic attack          EXAM:   /66 (BP Location: Right arm, Patient Position: Sitting, BP Cuff Size: Adult)   Pulse 70   Temp 36.9 °C (98.4 °F) (Temporal)   Resp 18   Ht 1.651 m (5' 5\")   Wt 62.1 kg (136 lb 12.8 oz)   SpO2 98%  Body mass index is 22.76 kg/m².    Constitutional: Well-developed and well-nourished. Not diaphoretic. No distress.   Skin: Skin is warm and dry. No rash noted.  Head: Atraumatic without lesions..  Neck: Supple, trachea midline. No thyromegaly present. No cervical or supraclavicular lymphadenopathy.  Cardiovascular: Regular rate and rhythm.   Chest: Effort normal. Clear to auscultation throughout. No adventitious sounds.   Abdomen: Soft, non tender, and without distention. Active bowel sounds in all four quadrants. No rebound, guarding, masses or hepatosplenomegaly.  Extremities: No cyanosis, clubbing, erythema, nor edema.   Neurological: Alert and oriented x 3. Sensation intact.   Psychiatric:  Behavior, mood, and affect are appropriate.       ASSESSMENT/PLAN:    1. Hypothyroidism, unspecified type  New to me, chronic problem.  Records reviewed from prior providers and labs from [place].  We will increase her levothyroxine dose to 100 mcg today.  She should repeat her TSH in 6 weeks patient verbalized understanding.  - TSH WITH REFLEX TO FT4; Future  - levothyroxine (SYNTHROID) 100 MCG Tab; Take 1 Tab by mouth Every morning on an empty stomach.  Dispense: 30 Tab; Refill: 1    2. History of PCOS  New to me, chronic improved.  Currently asymptomatic.  No medications.  Periods are regular      Return in about 1 " year (around 7/29/2020) for Preventative/Annual physical.       Please note that this dictation was created using voice recognition software. I have made every reasonable attempt to correct obvious errors, but I expect that there are errors of grammar and possibly content that I did not discover before finalizing the note.

## 2019-07-29 NOTE — ASSESSMENT & PLAN NOTE
This is a chronic problem which patient has stated that is well controlled since having her children.  She did require IVF with her first child but then conceived naturally with her second child 7 months later.  She reports that since that time her periods have been very regular and she has not had any problems work been taking any medication for this.

## 2019-08-27 ENCOUNTER — TELEPHONE (OUTPATIENT)
Dept: MEDICAL GROUP | Facility: LAB | Age: 34
End: 2019-08-27

## 2019-08-27 NOTE — TELEPHONE ENCOUNTER
VOICEMAIL  1. Caller Name: Mihai                      Call Back Number: [phone] (home)     2. Message: Mihai has a question about her thyroid labs and her refill.    3. Patient approves office to leave a detailed voicemail/MyChart message: yes

## 2019-08-27 NOTE — TELEPHONE ENCOUNTER
Phone Number Called: [phone] (home)     Call outcome: spoke to patient regarding message below    Message: I spoke to Mihai and I let her know that I will leave her lab order with the . For her Rx refill to call her pharmacy and they will send over the refill request to us.

## 2019-09-04 DIAGNOSIS — E03.9 HYPOTHYROIDISM, UNSPECIFIED TYPE: ICD-10-CM

## 2019-09-09 DIAGNOSIS — E03.9 HYPOTHYROIDISM, UNSPECIFIED TYPE: ICD-10-CM

## 2019-09-30 DIAGNOSIS — E03.9 HYPOTHYROIDISM, UNSPECIFIED TYPE: ICD-10-CM

## 2019-09-30 RX ORDER — LEVOTHYROXINE SODIUM 0.1 MG/1
100 TABLET ORAL
Qty: 30 TAB | Refills: 1 | Status: SHIPPED | OUTPATIENT
Start: 2019-09-30 | End: 2019-10-17

## 2019-09-30 NOTE — TELEPHONE ENCOUNTER
Was the patient seen in the last year in this department? Yes  7/29/19  Does patient have an active prescription for medications requested? No     Received Request Via: Patient     Patient is also requesting a lab order for her Thyroid.

## 2019-10-08 ENCOUNTER — OFFICE VISIT (OUTPATIENT)
Dept: URGENT CARE | Facility: CLINIC | Age: 34
End: 2019-10-08
Payer: COMMERCIAL

## 2019-10-08 VITALS
WEIGHT: 135 LBS | HEART RATE: 80 BPM | OXYGEN SATURATION: 98 % | BODY MASS INDEX: 22.47 KG/M2 | SYSTOLIC BLOOD PRESSURE: 100 MMHG | DIASTOLIC BLOOD PRESSURE: 72 MMHG | TEMPERATURE: 98.6 F | RESPIRATION RATE: 16 BRPM

## 2019-10-08 DIAGNOSIS — J98.8 RTI (RESPIRATORY TRACT INFECTION): ICD-10-CM

## 2019-10-08 DIAGNOSIS — J02.0 STREP PHARYNGITIS: ICD-10-CM

## 2019-10-08 LAB
FLUAV+FLUBV AG SPEC QL IA: NORMAL
INT CON NEG: NORMAL
INT CON NEG: NORMAL
INT CON POS: NORMAL
INT CON POS: NORMAL
S PYO AG THROAT QL: NORMAL

## 2019-10-08 PROCEDURE — 87880 STREP A ASSAY W/OPTIC: CPT | Performed by: FAMILY MEDICINE

## 2019-10-08 PROCEDURE — 87804 INFLUENZA ASSAY W/OPTIC: CPT | Performed by: FAMILY MEDICINE

## 2019-10-08 PROCEDURE — 99214 OFFICE O/P EST MOD 30 MIN: CPT | Performed by: FAMILY MEDICINE

## 2019-10-08 RX ORDER — AZITHROMYCIN 250 MG/1
TABLET, FILM COATED ORAL
Qty: 6 TAB | Refills: 0 | Status: SHIPPED | OUTPATIENT
Start: 2019-10-08 | End: 2019-10-22

## 2019-10-08 RX ORDER — DIPHENHYDRAMINE HYDROCHLORIDE AND LIDOCAINE HYDROCHLORIDE AND ALUMINUM HYDROXIDE AND MAGNESIUM HYDRO
5 KIT EVERY 6 HOURS PRN
Qty: 100 ML | Refills: 1 | Status: SHIPPED | OUTPATIENT
Start: 2019-10-08 | End: 2019-10-22

## 2019-10-08 ASSESSMENT — ENCOUNTER SYMPTOMS
COUGH: 1
FEVER: 1
SORE THROAT: 1

## 2019-10-08 NOTE — PROGRESS NOTES
Subjective:      Mihai Brandt is a 34 y.o. female who presents with Fever (congestion, sore throat, head ache x2 days )      - This is a pleasant and non toxic appearing 34 y.o. female with c/o 2 days w/ sore throat cough stuffy nose headache and low grade fevers 100. No NV/cp/sob. Says in past magic mouth wash helped and would like the same             ALLERGIES:  Patient has no known allergies.     PMH:  Past Medical History:   Diagnosis Date   • Anemia    • Anesthesia     N/V   • Indigestion    •  product of in vitro fertilization (IVF) pregnancy        • Pain     lower back pain   • PCOD (polycystic ovarian disease)     HAD LAPROSCOPIC SURGERY    • Pregnant    • Thyroid disease         PSH:  Past Surgical History:   Procedure Laterality Date   • REPEAT C SECTION  2016    Procedure: REPEAT C SECTION;  Surgeon: Alma Centeno M.D.;  Location: LABOR AND DELIVERY;  Service:    • PRIMARY C SECTION      2015       MEDS:    Current Outpatient Medications:   •  DPH-Lido-AlHydr-MgHydr-Simeth (MAGIC MOUTHWASH BLM) Suspension, Take 5 mL by mouth every 6 hours as needed. 1:1:1 ratio. Swish and SPIT. KIT is OK if that is how the pharmacy dispenses this medication, Disp: 100 mL, Rfl: 1  •  azithromycin (ZITHROMAX) 250 MG Tab, Use as directed, Disp: 6 Tab, Rfl: 0  •  levothyroxine (SYNTHROID) 100 MCG Tab, Take 1 Tab by mouth Every morning on an empty stomach., Disp: 30 Tab, Rfl: 1  •  Multiple Vitamin (MULTI-DAY VITAMINS PO), Take  by mouth., Disp: , Rfl:   •  cyanocobalamin (VITAMIN B-12) 100 MCG Tab, Take 100 mcg by mouth every day., Disp: , Rfl:     ** I have documented what I find to be significant in regards to past medical, social, family and surgical history  in my HPI or under PMH/PSH/FH review section, otherwise it is contributory **           HPI    Review of Systems   Constitutional: Positive for fever.   HENT: Positive for congestion and sore throat.    Respiratory: Positive for cough.     All other systems reviewed and are negative.         Objective:     /72   Pulse 80   Temp 37 °C (98.6 °F) (Temporal)   Resp 16   Wt 61.2 kg (135 lb)   SpO2 98%   BMI 22.47 kg/m²      Physical Exam   Constitutional: She appears well-developed and well-nourished. No distress.   HENT:   Head: Normocephalic and atraumatic.   Mouth/Throat: Oropharynx is clear and moist.   + pharyngeal erythema    Eyes: Conjunctivae are normal.   Cardiovascular: Normal heart sounds.   No murmur heard.  Pulmonary/Chest: Effort normal and breath sounds normal. No respiratory distress.   Lymphadenopathy:     She has cervical adenopathy.   Neurological: She is alert. She exhibits normal muscle tone.   Skin: Skin is warm and dry. She is not diaphoretic.   Psychiatric: She has a normal mood and affect. Judgment normal.   Nursing note and vitals reviewed.              Assessment/Plan:         1. RTI (respiratory tract infection)  DPH-Lido-AlHydr-MgHydr-Simeth (MAGIC MOUTHWASH BLM) Suspension   2. Strep pharyngitis  azithromycin (ZITHROMAX) 250 MG Tab       - rest/hydrate       Dx & d/c instructions discussed w/ patient and/or family members.     Follow up with PCP (or here if PCP unavailable) in 2-3 days if symptoms not improving, ER if feeling/getting worse.    Any realistic and/or common medication side effects that may have been given today(i.e. Rash, GI upset/constipation, sedation, elevation of BP or blood sugars) reviewed.     Patient left in stable condition

## 2019-10-17 RX ORDER — LEVOTHYROXINE SODIUM 88 UG/1
88 TABLET ORAL
Qty: 30 TAB | Refills: 3 | Status: SHIPPED | OUTPATIENT
Start: 2019-10-17 | End: 2020-05-01 | Stop reason: SDUPTHER

## 2019-10-22 ENCOUNTER — OFFICE VISIT (OUTPATIENT)
Dept: MEDICAL GROUP | Facility: LAB | Age: 34
End: 2019-10-22
Payer: COMMERCIAL

## 2019-10-22 VITALS
HEIGHT: 65 IN | WEIGHT: 138 LBS | SYSTOLIC BLOOD PRESSURE: 102 MMHG | TEMPERATURE: 98.5 F | OXYGEN SATURATION: 92 % | DIASTOLIC BLOOD PRESSURE: 60 MMHG | RESPIRATION RATE: 12 BRPM | HEART RATE: 44 BPM | BODY MASS INDEX: 22.99 KG/M2

## 2019-10-22 DIAGNOSIS — L70.0 ACNE VULGARIS: ICD-10-CM

## 2019-10-22 DIAGNOSIS — E03.9 HYPOTHYROIDISM, UNSPECIFIED TYPE: ICD-10-CM

## 2019-10-22 DIAGNOSIS — Z87.42 HISTORY OF PCOS: ICD-10-CM

## 2019-10-22 DIAGNOSIS — L65.9 HAIR LOSS: ICD-10-CM

## 2019-10-22 PROCEDURE — 99214 OFFICE O/P EST MOD 30 MIN: CPT | Performed by: NURSE PRACTITIONER

## 2019-10-22 RX ORDER — CLINDAMYCIN PHOSPHATE AND BENZOYL PEROXIDE 10; 50 MG/G; MG/G
1 GEL TOPICAL
Qty: 1 TUBE | Refills: 3 | Status: SHIPPED
Start: 2019-10-22 | End: 2020-05-14

## 2019-10-22 NOTE — PROGRESS NOTES
CC:Diagnoses of Acne vulgaris, Hair loss, Hypothyroidism, unspecified type, and History of PCOS were pertinent to this visit.    HISTORY OF PRESENT ILLNESS: Mihai Brandt is a 34 y.o. female established patient who presents today to discuss the following problems:    Patient presents today to discuss her continued hair loss and development of acne that has been worsening over several months.  She does have a history of PCOS.  She has been using over-the-counter acne face wash however she cannot remember the brand.  She has not had a problem with acne since she was a teenager at which time she was given an antibiotic cream which did seem to help her symptoms.  She reports that this has been constant and bothersome.  The hair loss is also very concerning to her as she thought it was related to her thyroid however she seems to be losing handfuls of hair every time she brushes in the morning and notices areas of thinning in her scalp which she states even other people are commenting on.  There are no areas of patchy hair loss however.  She denies any fevers, weight loss, chills.    Health Maintenance: Completed    Patient Active Problem List    Diagnosis Date Noted   • Acne vulgaris 10/22/2019   • History of PCOS 01/04/2019   • Hypothyroidism 01/04/2019        Allergies:Patient has no known allergies.    Current Outpatient Medications   Medication Sig Dispense Refill   • Clindamycin-Benzoyl Per, Refr, 1.2-5 % Gel 1 Application by Apply externally route every bedtime. 1 Tube 3   • levothyroxine (SYNTHROID) 88 MCG Tab Take 1 Tab by mouth Every morning on an empty stomach. 30 Tab 3   • Multiple Vitamin (MULTI-DAY VITAMINS PO) Take  by mouth.       No current facility-administered medications for this visit.        Social History     Tobacco Use   • Smoking status: Never Smoker   • Smokeless tobacco: Never Used   Substance Use Topics   • Alcohol use: No   • Drug use: No     Social History     Social History Narrative  "  • Not on file       Family History   Problem Relation Age of Onset   • Heart Disease Father    • Hypertension Father    • Heart Disease Paternal Grandfather        ROS:     No fevers or weight loss  No headaches or sore throat  No chest pain or shortness of breath  No bowel changes  No lower extremity edema  No suicidal ideation or panic attack        EXAM:   /60 (BP Location: Right arm, Patient Position: Sitting, BP Cuff Size: Adult)   Pulse (!) 44   Temp 36.9 °C (98.5 °F) (Temporal)   Resp 12   Ht 1.651 m (5' 5\")   Wt 62.6 kg (138 lb)   SpO2 92%  Body mass index is 22.96 kg/m².    Constitutional: Well-developed and well-nourished. Not diaphoretic. No distress.   Skin:Acne vulgaris. No discreet areas of patchy baldness visualized. Skin is warm and dry. No rash noted.  Head: Atraumatic without lesions.  Neck: Supple, trachea midline. No thyromegaly present. No cervical or supraclavicular lymphadenopathy.  Cardiovascular: Regular rate and rhythm.   Chest: Effort normal. Clear to auscultation throughout. No adventitious sounds.   Abdomen: Soft, non tender, and without distention. Active bowel sounds in all four quadrants. No rebound, guarding, masses or hepatosplenomegaly.  Extremities: No cyanosis, clubbing, erythema, nor edema.   Neurological: Alert and oriented x 3. Sensation intact.   Psychiatric:  Behavior, mood, and affect are appropriate.       ASSESSMENT/PLAN:    1. Acne vulgaris  New problem uncontrolled. - Pt instructed on skin care associated with acne.   - Recommend washing the face BID with oil free soap (ex. Cetaphil or Acne Face Wash).   - Use non-comedonegenic makeup without oil  - In the morning, pt is advised to apply an oil free moisturizer with SPF.   - In the evening, patient is to apply Duac.   - Patient cautioned on sun sensitivity related to the retinoid & cautioned to use SPF judiciously for prevention of sunburn.      - TESTOSTERONE F&T FEMALES/CHILD; Future  - DHEA SULFATE; " Future  - Clindamycin-Benzoyl Per, Refr, 1.2-5 % Gel; 1 Application by Apply externally route every bedtime.  Dispense: 1 Tube; Refill: 3    2. Hair loss  New problem uncontrolled.  Labs as indicated.  History of PCOS.  - TESTOSTERONE F&T FEMALES/CHILD; Future  - DHEA SULFATE; Future    3. Hypothyroidism, unspecified type  Chronic, uncontrolled.  Patient is overtreated and we have discussed most recent lab results. Decreased levothyroxine to 88 mcg and patient verbalized understanding.      Return in about 3 months (around 1/22/2020) for Pap.       Please note that this dictation was created using voice recognition software. I have made every reasonable attempt to correct obvious errors, but I expect that there are errors of grammar and possibly content that I did not discover before finalizing the note.

## 2020-01-21 ENCOUNTER — OFFICE VISIT (OUTPATIENT)
Dept: URGENT CARE | Facility: CLINIC | Age: 35
End: 2020-01-21
Payer: COMMERCIAL

## 2020-01-21 ENCOUNTER — HOSPITAL ENCOUNTER (OUTPATIENT)
Facility: MEDICAL CENTER | Age: 35
End: 2020-01-21
Attending: FAMILY MEDICINE
Payer: COMMERCIAL

## 2020-01-21 VITALS
SYSTOLIC BLOOD PRESSURE: 110 MMHG | OXYGEN SATURATION: 99 % | TEMPERATURE: 98.3 F | HEART RATE: 78 BPM | WEIGHT: 132 LBS | DIASTOLIC BLOOD PRESSURE: 72 MMHG | RESPIRATION RATE: 18 BRPM | BODY MASS INDEX: 21.97 KG/M2

## 2020-01-21 DIAGNOSIS — N30.00 ACUTE CYSTITIS WITHOUT HEMATURIA: ICD-10-CM

## 2020-01-21 LAB
APPEARANCE UR: NORMAL
BILIRUB UR STRIP-MCNC: NORMAL MG/DL
COLOR UR AUTO: NORMAL
FORWARD REASON: SPWHY: NORMAL
FORWARDED TO LAB: SPWHR: NORMAL
GLUCOSE UR STRIP.AUTO-MCNC: NORMAL MG/DL
KETONES UR STRIP.AUTO-MCNC: NORMAL MG/DL
LEUKOCYTE ESTERASE UR QL STRIP.AUTO: NORMAL
NITRITE UR QL STRIP.AUTO: NORMAL
PH UR STRIP.AUTO: 6.5 [PH] (ref 5–8)
PROT UR QL STRIP: 100 MG/DL
RBC UR QL AUTO: NORMAL
SP GR UR STRIP.AUTO: 1.02
SPECIMEN SENT: SPWT1: NORMAL
UROBILINOGEN UR STRIP-MCNC: 0.2 MG/DL

## 2020-01-21 PROCEDURE — 81002 URINALYSIS NONAUTO W/O SCOPE: CPT | Performed by: FAMILY MEDICINE

## 2020-01-21 PROCEDURE — 99214 OFFICE O/P EST MOD 30 MIN: CPT | Performed by: FAMILY MEDICINE

## 2020-01-21 RX ORDER — CEFDINIR 300 MG/1
300 CAPSULE ORAL EVERY 12 HOURS
Qty: 10 CAP | Refills: 0 | Status: SHIPPED | OUTPATIENT
Start: 2020-01-21 | End: 2020-01-26

## 2020-01-21 RX ORDER — PHENAZOPYRIDINE HYDROCHLORIDE 200 MG/1
200 TABLET, FILM COATED ORAL 3 TIMES DAILY
Qty: 6 TAB | Refills: 0 | Status: SHIPPED | OUTPATIENT
Start: 2020-01-21 | End: 2020-01-23

## 2020-01-21 ASSESSMENT — ENCOUNTER SYMPTOMS
SHORTNESS OF BREATH: 0
FEVER: 0
FLANK PAIN: 0
DIZZINESS: 0
VOMITING: 0

## 2020-01-21 NOTE — PROGRESS NOTES
Subjective:     Mihai Brandt is a 34 y.o. female who presents for Dysuria    HPI  Pt presents for evaluation of a new problem   Patient with dysuria for the past 2 days  Dysuria is constant, every void, and not improving  Has tried drinking increased amounts of water without improvements  Also having urinary frequency and cloudiness  No associated hematuria  Has some lower abdominal pain, however no flank pain  No fevers, vomiting, diarrhea    Review of Systems   Constitutional: Negative for fever.   Respiratory: Negative for shortness of breath.    Cardiovascular: Negative for chest pain.   Gastrointestinal: Negative for vomiting.   Genitourinary: Positive for dysuria, frequency and urgency. Negative for flank pain and hematuria.   Skin: Negative for rash.   Neurological: Negative for dizziness.     PMH:  has a past medical history of Anemia, Anesthesia, Indigestion, [place] product of in vitro fertilization (IVF) pregnancy, Pain, PCOD (polycystic ovarian disease), Pregnant, and Thyroid disease.  MEDS:   Current Outpatient Medications:   •  levothyroxine (SYNTHROID) 88 MCG Tab, Take 1 Tab by mouth Every morning on an empty stomach., Disp: 30 Tab, Rfl: 3  •  Multiple Vitamin (MULTI-DAY VITAMINS PO), Take  by mouth., Disp: , Rfl:   •  Clindamycin-Benzoyl Per, Refr, 1.2-5 % Gel, 1 Application by Apply externally route every bedtime., Disp: 1 Tube, Rfl: 3  ALLERGIES: No Known Allergies  SURGHX:   Past Surgical History:   Procedure Laterality Date   • REPEAT C SECTION  2016    Procedure: REPEAT C SECTION;  Surgeon: Alma Centeno M.D.;  Location: LABOR AND DELIVERY;  Service:    • PRIMARY C SECTION      2015     SOCHX:  reports that she has never smoked. She has never used smokeless tobacco. She reports that she does not drink alcohol or use drugs.  FH: Family history was reviewed, not contributing to acute illness     Objective:   /72   Pulse 78   Temp 36.8 °C (98.3 °F) (Temporal)   Resp 18   Wt  59.9 kg (132 lb)   SpO2 99%   BMI 21.97 kg/m²     Physical Exam  Constitutional:       General: She is not in acute distress.     Appearance: She is well-developed. She is not diaphoretic.   HENT:      Head: Normocephalic and atraumatic.   Pulmonary:      Effort: Pulmonary effort is normal.   Abdominal:      General: Abdomen is flat. Bowel sounds are normal. There is no distension.      Palpations: Abdomen is soft.      Tenderness: There is no right CVA tenderness, left CVA tenderness, guarding or rebound.      Comments: +Mild TTP in the suprapubic area    Skin:     General: Skin is warm and dry.      Findings: No erythema.   Neurological:      Mental Status: She is alert and oriented to person, place, and time.   Psychiatric:         Mood and Affect: Mood normal.         Behavior: Behavior normal.         Thought Content: Thought content normal.         Judgment: Judgment normal.         Assessment/Plan:   Assessment    1. Acute cystitis without hematuria  - POCT Urinalysis  - Urine Culture [[id number]]; Future  - cefdinir (OMNICEF) 300 MG Cap; Take 1 Cap by mouth every 12 hours for 5 days.  Dispense: 10 Cap; Refill: 0  - phenazopyridine (PYRIDIUM) 200 MG Tab; Take 1 Tab by mouth 3 times a day for 2 days.  Dispense: 6 Tab; Refill: 0

## 2020-05-01 RX ORDER — LEVOTHYROXINE SODIUM 88 UG/1
88 TABLET ORAL
Qty: 30 TAB | Refills: 3 | Status: SHIPPED | OUTPATIENT
Start: 2020-05-01 | End: 2020-05-14 | Stop reason: SDUPTHER

## 2020-05-01 NOTE — TELEPHONE ENCOUNTER
Received request via: Patient    Was the patient seen in the last year in this department? Yes    Does the patient have an active prescription (recently filled or refills available) for medication(s) requested? No     levothyroxine (SYNTHROID) 88 MCG Tab

## 2020-05-14 ENCOUNTER — OFFICE VISIT (OUTPATIENT)
Dept: MEDICAL GROUP | Facility: LAB | Age: 35
End: 2020-05-14
Payer: COMMERCIAL

## 2020-05-14 VITALS
SYSTOLIC BLOOD PRESSURE: 108 MMHG | WEIGHT: 140 LBS | HEIGHT: 66 IN | TEMPERATURE: 98.1 F | BODY MASS INDEX: 22.5 KG/M2 | HEART RATE: 70 BPM | DIASTOLIC BLOOD PRESSURE: 68 MMHG

## 2020-05-14 DIAGNOSIS — E03.9 HYPOTHYROIDISM, UNSPECIFIED TYPE: ICD-10-CM

## 2020-05-14 DIAGNOSIS — R10.2 PELVIC PAIN: ICD-10-CM

## 2020-05-14 PROCEDURE — 99214 OFFICE O/P EST MOD 30 MIN: CPT | Performed by: FAMILY MEDICINE

## 2020-05-14 RX ORDER — LEVOTHYROXINE SODIUM 88 UG/1
88 TABLET ORAL
Qty: 30 TAB | Refills: 3 | Status: SHIPPED
Start: 2020-05-14 | End: 2020-05-14

## 2020-05-14 RX ORDER — ACETAMINOPHEN 325 MG/1
650 TABLET ORAL
COMMUNITY
End: 2020-05-14

## 2020-05-14 RX ORDER — NITROFURANTOIN 25; 75 MG/1; MG/1
100 CAPSULE ORAL
COMMUNITY
Start: 2020-03-09 | End: 2020-06-07

## 2020-05-14 RX ORDER — LEVOTHYROXINE SODIUM 88 UG/1
88 TABLET ORAL
Qty: 30 TAB | Refills: 11 | Status: SHIPPED | OUTPATIENT
Start: 2020-05-14 | End: 2020-10-26

## 2020-05-14 ASSESSMENT — ENCOUNTER SYMPTOMS
CONSTIPATION: 0
BLURRED VISION: 0
BLOOD IN STOOL: 0
VOMITING: 0
CHILLS: 0
DIARRHEA: 0
DOUBLE VISION: 0
NAUSEA: 0
PALPITATIONS: 0
FEVER: 0
ABDOMINAL PAIN: 1
WHEEZING: 0
SHORTNESS OF BREATH: 0

## 2020-05-14 ASSESSMENT — PATIENT HEALTH QUESTIONNAIRE - PHQ9: CLINICAL INTERPRETATION OF PHQ2 SCORE: 0

## 2020-05-14 NOTE — PROGRESS NOTES
Subjective:   Mihai Brandt is a 34 y.o. female here today for   Chief Complaint   Patient presents with   • Establish Care   • Abdominal Cramping     Hx POS, endo, experiencing belly tenderness.    • Lab Results     Hormone test results, DHEA        #Abdominal cramping   -Tenderness at lower abdomen  -Onset: last 5 months   -Describe it as a crampy, sharp pain that is nonradiating.The pain is worse with periods.  -Hx of endometriosis, PCOS, laproscopy  , The pain is similar to previous episode she has had with PCOS and endometriosis.   -Treats with rest, IBU, will only help to some degree     #Hypothyroid:  -Patient is longstanding history of hypothyroidism.  Currently on Synthroid 88 mcg daily.  Previous TSH was normal.  States that she is feeling well.  Denies any increased fatigue, change her hair or nails, hot or cold intolerance.  No concerns at this time.\    No Known Allergies      Current medicines (including changes today)  Current Outpatient Medications   Medication Sig Dispense Refill   • nitrofurantoin (MACROBID) 100 MG Cap Take 100 mg by mouth.     • NON SPECIFIED [employer] multi vitamin supplements     • levothyroxine (SYNTHROID) 88 MCG Tab Take 1 Tab by mouth Every morning on an empty stomach. 30 Tab 3     No current facility-administered medications for this visit.      She  has a past medical history of Anemia, Anesthesia, Indigestion,  product of in vitro fertilization (IVF) pregnancy, Pain, PCOD (polycystic ovarian disease), Pregnant, and Thyroid disease.    ROS   Review of Systems   Constitutional: Negative for chills and fever.   HENT: Negative for hearing loss.    Eyes: Negative for blurred vision and double vision.   Respiratory: Negative for shortness of breath and wheezing.    Cardiovascular: Negative for chest pain and palpitations.   Gastrointestinal: Positive for abdominal pain. Negative for blood in stool, constipation, diarrhea, nausea and vomiting.   Genitourinary:  "Negative for dysuria.      Objective:     Physical Exam:  /68 (BP Location: Right arm, Patient Position: Sitting, BP Cuff Size: Adult)   Pulse 70   Temp 36.7 °C (98.1 °F) (Temporal)   Ht 1.676 m (5' 6\")   Wt 63.5 kg (140 lb)  Body mass index is 22.6 kg/m².   Constitutional: Alert, no distress.  Skin: Warm, dry, good turgor, no rashes in visible areas.  Eye: Equal, round and reactive, conjunctiva clear, lids normal.  ENMT: TM's clear bilaterally, lips without lesions, good dentition, oropharynx clear.  Neck: Trachea midline, no masses, no thyromegaly. No cervical or supraclavicular lymphadenopathy.  Respiratory: Unlabored respiratory effort, lungs clear to auscultation, no wheezes, no rhonchi.  Cardiovascular: Normal S1, S2, no murmur, no edema.  Abdomen: Diffuse tenderness palpation along the lower abdominal quadrant/pelvis bilaterally.  There is a small, 2 x 1 cm palpable mass that is tender to palpation in the right lower abdomen/pelvis.  No hepatosplenomegaly, no rebound pain, normal bowel sounds.  Psych: Alert and oriented x3, normal affect and mood.    Assessment and Plan:     1. Pelvic pain  -Given her history of PCOS and endometriosis I suspect the pelvic pain is due to these conditions.  Given the masses palpated on physical exam I do believe further evaluation is needed with ultrasound which we will order at this time.  Patient continue using ibuprofen, Tylenol as needed.  Return precautions given.  - US-PELVIC COMPLETE (TRANSABDOMINAL/TRANSVAGINAL) (COMBO); Future    2. Hypothyroidism, unspecified type  -Status: Stable, reviewed previous labs with a normal TSH.  -At this time will refill levothyroxine.  -Follow-up as needed.      Followup: No follow-ups on file.         PLEASE NOTE: This dictation was created using voice recognition software. I have made every reasonable attempt to correct obvious errors, but I expect that there are errors of grammar and possibly content that I did not discover " before finalizing the note.

## 2020-06-02 ENCOUNTER — HOSPITAL ENCOUNTER (OUTPATIENT)
Dept: RADIOLOGY | Facility: MEDICAL CENTER | Age: 35
End: 2020-06-02
Attending: FAMILY MEDICINE
Payer: COMMERCIAL

## 2020-06-02 DIAGNOSIS — R10.2 PELVIC PAIN: ICD-10-CM

## 2020-06-02 PROCEDURE — 76830 TRANSVAGINAL US NON-OB: CPT

## 2020-06-07 ENCOUNTER — OFFICE VISIT (OUTPATIENT)
Dept: URGENT CARE | Facility: CLINIC | Age: 35
End: 2020-06-07
Payer: COMMERCIAL

## 2020-06-07 ENCOUNTER — HOSPITAL ENCOUNTER (OUTPATIENT)
Facility: MEDICAL CENTER | Age: 35
End: 2020-06-07
Attending: NURSE PRACTITIONER
Payer: COMMERCIAL

## 2020-06-07 VITALS
SYSTOLIC BLOOD PRESSURE: 112 MMHG | HEIGHT: 65 IN | DIASTOLIC BLOOD PRESSURE: 70 MMHG | RESPIRATION RATE: 16 BRPM | BODY MASS INDEX: 22.49 KG/M2 | TEMPERATURE: 98.1 F | HEART RATE: 68 BPM | WEIGHT: 135 LBS | OXYGEN SATURATION: 96 %

## 2020-06-07 DIAGNOSIS — N39.0 URINARY TRACT INFECTION WITHOUT HEMATURIA, SITE UNSPECIFIED: ICD-10-CM

## 2020-06-07 DIAGNOSIS — R30.0 DYSURIA: ICD-10-CM

## 2020-06-07 LAB
APPEARANCE UR: CLEAR
BILIRUB UR STRIP-MCNC: NORMAL MG/DL
COLOR UR AUTO: YELLOW
FORWARD REASON: SPWHY: NORMAL
FORWARDED TO LAB: SPWHR: NORMAL
GLUCOSE UR STRIP.AUTO-MCNC: NORMAL MG/DL
INT CON NEG: NORMAL
INT CON POS: NORMAL
KETONES UR STRIP.AUTO-MCNC: NORMAL MG/DL
LEUKOCYTE ESTERASE UR QL STRIP.AUTO: NORMAL
NITRITE UR QL STRIP.AUTO: NORMAL
PH UR STRIP.AUTO: 6 [PH] (ref 5–8)
POC URINE PREGNANCY TEST: NEGATIVE
PROT UR QL STRIP: 100 MG/DL
RBC UR QL AUTO: NORMAL
SP GR UR STRIP.AUTO: 1.03
SPECIMEN SENT: SPWT1: NORMAL
UROBILINOGEN UR STRIP-MCNC: 0.2 MG/DL

## 2020-06-07 PROCEDURE — 81002 URINALYSIS NONAUTO W/O SCOPE: CPT | Performed by: NURSE PRACTITIONER

## 2020-06-07 PROCEDURE — 81025 URINE PREGNANCY TEST: CPT | Performed by: NURSE PRACTITIONER

## 2020-06-07 PROCEDURE — 99000 SPECIMEN HANDLING OFFICE-LAB: CPT | Performed by: NURSE PRACTITIONER

## 2020-06-07 PROCEDURE — 99214 OFFICE O/P EST MOD 30 MIN: CPT | Performed by: NURSE PRACTITIONER

## 2020-06-07 RX ORDER — CEFDINIR 300 MG/1
300 CAPSULE ORAL EVERY 12 HOURS
Qty: 10 CAP | Refills: 0 | Status: SHIPPED | OUTPATIENT
Start: 2020-06-07 | End: 2020-06-12

## 2020-06-07 RX ORDER — PHENAZOPYRIDINE HYDROCHLORIDE 200 MG/1
200 TABLET, FILM COATED ORAL 3 TIMES DAILY
Qty: 6 TAB | Refills: 0 | Status: SHIPPED | OUTPATIENT
Start: 2020-06-07 | End: 2020-06-09

## 2020-06-07 ASSESSMENT — ENCOUNTER SYMPTOMS
CARDIOVASCULAR NEGATIVE: 1
CONSTIPATION: 0
FEVER: 0
WHEEZING: 0
RESPIRATORY NEGATIVE: 1
SHORTNESS OF BREATH: 0
CHILLS: 0
NAUSEA: 0
VOMITING: 0
FLANK PAIN: 0
ABDOMINAL PAIN: 0
DIARRHEA: 0

## 2020-06-07 NOTE — PROGRESS NOTES
"Subjective:   Mihai Brandt is a 34 y.o. female who presents for Urinary Frequency (Couple days frequency urination )        Dysuria    This is a new problem. The current episode started in the past 7 days (Started 2 days ago). The problem occurs every urination. The problem has been waxing and waning. The quality of the pain is described as burning and aching. She is sexually active. There is no history of pyelonephritis. Associated symptoms include frequency, hesitancy and urgency. Pertinent negatives include no chills, discharge, flank pain, hematuria, nausea or vomiting. She has tried increased fluids for the symptoms. The treatment provided no relief. Her past medical history is significant for recurrent UTIs.        Review of Systems   Constitutional: Negative for chills and fever.   Respiratory: Negative.  Negative for shortness of breath and wheezing.    Cardiovascular: Negative.  Negative for chest pain.   Gastrointestinal: Negative for abdominal pain, constipation, diarrhea, nausea and vomiting.   Genitourinary: Positive for dysuria, frequency, hesitancy and urgency. Negative for flank pain and hematuria.   Skin: Negative.      PMH:  has a past medical history of Anemia, Anesthesia, Indigestion,  product of in vitro fertilization (IVF) pregnancy, Pain, PCOD (polycystic ovarian disease), Pregnant, and Thyroid disease.  ALLERGIES: No Known Allergies    Patient's PMH, SocHx, SurgHx, FamHx, Drug allergies and medications reviewed.     Objective:   /70   Pulse 68   Temp 36.7 °C (98.1 °F) (Temporal)   Resp 16   Ht 1.651 m (5' 5\")   Wt 61.2 kg (135 lb)   SpO2 96%   BMI 22.47 kg/m²   Physical Exam  Vitals signs reviewed.   Constitutional:       Appearance: She is well-developed.   HENT:      Right Ear: Hearing normal.      Left Ear: Hearing normal.   Eyes:      Conjunctiva/sclera: Conjunctivae normal.      Pupils: Pupils are equal, round, and reactive to light.   Cardiovascular:      Rate " and Rhythm: Normal rate and regular rhythm.      Heart sounds: Normal heart sounds. No murmur.   Pulmonary:      Effort: Pulmonary effort is normal. No respiratory distress.      Breath sounds: Normal breath sounds.   Abdominal:      General: Bowel sounds are normal. There is no distension.      Palpations: Abdomen is soft.      Tenderness: There is abdominal tenderness in the suprapubic area. There is no right CVA tenderness, left CVA tenderness, guarding or rebound.   Skin:     General: Skin is warm and dry.      Capillary Refill: Capillary refill takes less than 2 seconds.   Neurological:      Mental Status: She is alert and oriented to person, place, and time.   Psychiatric:         Behavior: Behavior normal.         Thought Content: Thought content normal.         Judgment: Judgment normal.           Assessment/Plan:   Assessment    1. Dysuria  POCT Urinalysis    POCT Pregnancy    REFERRAL TO UROLOGY   2. Urinary tract infection without hematuria, site unspecified  cefdinir (OMNICEF) 300 MG Cap    phenazopyridine (PYRIDIUM) 200 MG Tab    Urine Culture    REFERRAL TO UROLOGY     Pregnancy negative  UA with large leuks and blood. Will send for culture and call with results.   Referral to Urology for recurrent UTIs.   Patient encouraged to increase clear liquid intake  Strict ER precautions discussed    Differential diagnosis, natural history, supportive care, and indications for immediate follow-up discussed.     **Please note that all invasive procedures during this visit were performed by myself and/or the Medical Assistant under the supervision of the PA or MD in office**

## 2020-06-10 ENCOUNTER — TELEPHONE (OUTPATIENT)
Dept: URGENT CARE | Facility: CLINIC | Age: 35
End: 2020-06-10

## 2020-06-10 NOTE — TELEPHONE ENCOUNTER
Called  [facility] for patient's Urine Culture results and was informed that they did not receive any specimen from this patient.  Called [facility] to verify if it was because of the patient's insurance, and it was.  Neither of this labs have the specimen.

## 2020-06-22 ENCOUNTER — TELEPHONE (OUTPATIENT)
Dept: URGENT CARE | Facility: CLINIC | Age: 35
End: 2020-06-22

## 2020-06-22 NOTE — TELEPHONE ENCOUNTER
Called patient to let her know that her Specimen was lost when it was sent over to [facility] from [facility] lab because of her insurance.  Left voice message to call us back to go over that.

## 2020-12-14 ENCOUNTER — OFFICE VISIT (OUTPATIENT)
Dept: MEDICAL GROUP | Facility: LAB | Age: 35
End: 2020-12-14
Payer: COMMERCIAL

## 2020-12-14 VITALS
SYSTOLIC BLOOD PRESSURE: 104 MMHG | DIASTOLIC BLOOD PRESSURE: 62 MMHG | OXYGEN SATURATION: 98 % | HEIGHT: 65 IN | RESPIRATION RATE: 12 BRPM | HEART RATE: 71 BPM | BODY MASS INDEX: 23.32 KG/M2 | TEMPERATURE: 98 F | WEIGHT: 140 LBS

## 2020-12-14 DIAGNOSIS — Z23 NEED FOR VACCINATION: ICD-10-CM

## 2020-12-14 DIAGNOSIS — N39.0 CHRONIC UTI: ICD-10-CM

## 2020-12-14 DIAGNOSIS — E03.9 HYPOTHYROIDISM, UNSPECIFIED TYPE: ICD-10-CM

## 2020-12-14 PROCEDURE — 90471 IMMUNIZATION ADMIN: CPT | Performed by: FAMILY MEDICINE

## 2020-12-14 PROCEDURE — 90686 IIV4 VACC NO PRSV 0.5 ML IM: CPT | Performed by: FAMILY MEDICINE

## 2020-12-14 PROCEDURE — 99213 OFFICE O/P EST LOW 20 MIN: CPT | Mod: 25 | Performed by: FAMILY MEDICINE

## 2020-12-14 RX ORDER — LEVOTHYROXINE SODIUM 88 UG/1
TABLET ORAL
Qty: 90 TAB | Refills: 3 | Status: SHIPPED | OUTPATIENT
Start: 2020-12-14 | End: 2021-10-13

## 2020-12-14 ASSESSMENT — ENCOUNTER SYMPTOMS
NAUSEA: 0
PALPITATIONS: 0
CONSTIPATION: 0
ABDOMINAL PAIN: 0
WHEEZING: 0
HEADACHES: 0
DIZZINESS: 0
FEVER: 0
DIARRHEA: 0
BLURRED VISION: 0
VOMITING: 0
CHILLS: 0
SHORTNESS OF BREATH: 0

## 2020-12-14 NOTE — PROGRESS NOTES
"Subjective:   Mihai Brandt is a 35 y.o. female here today for   Chief Complaint   Patient presents with   • Results   • Hypothyroidism     Follow up      #Hypothyroidism:  -Chronic longstanding condition currently treated with levothyroxine 80 mcg daily.  Patient is compliant medication, denies any side effects medication.  States that she is doing well, denies any change in hair or nails, hot and cold intolerance, tremors, abdominal pain, nausea, constipation, diarrhea.    #Chronic UTI:  -Again this year patient is experiencing chronic UTIs, was seen by [place] [facility] prescribed antibiotic on an as-needed basis.  She states that she is starting probiotics beginning the summer and since then has not had to use the antibiotic.  She denies any flank pain, dysuria, hematuria, nausea, vomiting, fevers, chills.    No Known Allergies      Current medicines (including changes today)  Current Outpatient Medications   Medication Sig Dispense Refill   • levothyroxine (EUTHYROX) 88 MCG Tab TAKE 1 TABLET BY MOUTH IN THE MORNING ON AN EMPTY STOMACH 90 Tab 3     No current facility-administered medications for this visit.      She  has a past medical history of Anemia, Anesthesia, Indigestion, [place] product of in vitro fertilization (IVF) pregnancy, Pain, PCOD (polycystic ovarian disease), Pregnant, and Thyroid disease.    ROS   Review of Systems   Constitutional: Negative for chills and fever.   Eyes: Negative for blurred vision.   Respiratory: Negative for shortness of breath and wheezing.    Cardiovascular: Negative for chest pain and palpitations.   Gastrointestinal: Negative for abdominal pain, constipation, diarrhea, nausea and vomiting.   Skin: Negative for rash.   Neurological: Negative for dizziness and headaches.      Objective:     Physical Exam:  /62 (BP Location: Right arm, Patient Position: Sitting, BP Cuff Size: Adult)   Pulse 71   Temp 36.7 °C (98 °F) (Temporal)   Resp 12   Ht 1.651 m (5' 5\")   " Wt 63.5 kg (140 lb)   SpO2 98%  Body mass index is 23.3 kg/m².   Constitutional: Alert, no distress.  Skin: Warm, dry, good turgor, no rashes in visible areas.  Eye: Equal, round and reactive, conjunctiva clear, lids normal.  ENMT: TM's clear bilaterally, lips without lesions, good dentition, oropharynx clear.  Neck: Trachea midline, no masses, no thyromegaly. No cervical or supraclavicular lymphadenopathy.  Respiratory: Unlabored respiratory effort, lungs clear to auscultation, no wheezes, no rhonchi.  Cardiovascular: Normal S1, S2, no murmur, no edema.  Abdomen: Soft, non-tender, no masses, no hepatosplenomegaly.  Psych: Alert and oriented x3, normal affect and mood.    Assessment and Plan:     1. Hypothyroidism, unspecified type  -Status: Stable.  Continue with levothyroxine.  Will recheck TSH levels at this time.  Patient will follow-up as needed.  - TSH WITH REFLEX TO FT4; Future  - levothyroxine (EUTHYROX) 88 MCG Tab; TAKE 1 TABLET BY MOUTH IN THE MORNING ON AN EMPTY STOMACH  Dispense: 90 Tab; Refill: 3    2. Chronic UTI  Status: Stable.  Symptoms have improved since probiotics.  Patient plans on continue probiotics at this time.  Will take antibiotic as needed, follow-up with urology as needed.    3. Need for vaccination  Patient was agreeable to receiving the Influenza vaccine in clinic today after discussion of potential risks, benefits, and side effects. Vaccine was administered without adverse effects.  - Influenza Vaccine Quad Injection (PF)      Followup: Return in about 1 year (around 12/14/2021).         PLEASE NOTE: This dictation was created using voice recognition software. I have made every reasonable attempt to correct obvious errors, but I expect that there are errors of grammar and possibly content that I did not discover before finalizing the note.

## 2021-05-16 ENCOUNTER — HOSPITAL ENCOUNTER (OUTPATIENT)
Facility: MEDICAL CENTER | Age: 36
End: 2021-05-16
Attending: NURSE PRACTITIONER
Payer: COMMERCIAL

## 2021-05-16 ENCOUNTER — OFFICE VISIT (OUTPATIENT)
Dept: URGENT CARE | Facility: CLINIC | Age: 36
End: 2021-05-16
Payer: COMMERCIAL

## 2021-05-16 VITALS
HEART RATE: 68 BPM | SYSTOLIC BLOOD PRESSURE: 102 MMHG | OXYGEN SATURATION: 92 % | RESPIRATION RATE: 16 BRPM | DIASTOLIC BLOOD PRESSURE: 60 MMHG | BODY MASS INDEX: 22.82 KG/M2 | WEIGHT: 137 LBS | HEIGHT: 65 IN | TEMPERATURE: 98.2 F

## 2021-05-16 DIAGNOSIS — R30.0 DYSURIA: ICD-10-CM

## 2021-05-16 DIAGNOSIS — N30.00 ACUTE CYSTITIS WITHOUT HEMATURIA: ICD-10-CM

## 2021-05-16 DIAGNOSIS — Z87.440 HISTORY OF RECURRENT UTIS: ICD-10-CM

## 2021-05-16 LAB
APPEARANCE UR: CLEAR
BILIRUB UR STRIP-MCNC: NEGATIVE MG/DL
COLOR UR AUTO: NORMAL
GLUCOSE UR STRIP.AUTO-MCNC: NEGATIVE MG/DL
KETONES UR STRIP.AUTO-MCNC: NEGATIVE MG/DL
LEUKOCYTE ESTERASE UR QL STRIP.AUTO: NORMAL
NITRITE UR QL STRIP.AUTO: NEGATIVE
PH UR STRIP.AUTO: 6.5 [PH] (ref 5–8)
PROT UR QL STRIP: NEGATIVE MG/DL
RBC UR QL AUTO: NEGATIVE
SP GR UR STRIP.AUTO: 1.01
UROBILINOGEN UR STRIP-MCNC: 0.2 MG/DL

## 2021-05-16 PROCEDURE — 81002 URINALYSIS NONAUTO W/O SCOPE: CPT | Performed by: NURSE PRACTITIONER

## 2021-05-16 PROCEDURE — 87086 URINE CULTURE/COLONY COUNT: CPT

## 2021-05-16 PROCEDURE — 99214 OFFICE O/P EST MOD 30 MIN: CPT | Performed by: NURSE PRACTITIONER

## 2021-05-16 RX ORDER — CEPHALEXIN 500 MG/1
500 CAPSULE ORAL 2 TIMES DAILY
Qty: 10 CAPSULE | Refills: 0 | Status: SHIPPED | OUTPATIENT
Start: 2021-05-16 | End: 2021-05-21

## 2021-05-16 RX ORDER — PHENAZOPYRIDINE HYDROCHLORIDE 200 MG/1
200 TABLET, FILM COATED ORAL 3 TIMES DAILY PRN
Qty: 3 TABLET | Refills: 0 | Status: SHIPPED | OUTPATIENT
Start: 2021-05-16

## 2021-05-16 ASSESSMENT — ENCOUNTER SYMPTOMS
SWEATS: 0
PALPITATIONS: 0
MEMORY LOSS: 0
HEARTBURN: 0
SHORTNESS OF BREATH: 0
DIARRHEA: 0
NAUSEA: 0
FLANK PAIN: 0
ORTHOPNEA: 0
BACK PAIN: 0
DIZZINESS: 0
CONSTIPATION: 0
TINGLING: 0
WHEEZING: 0
CHILLS: 0
VOMITING: 0

## 2021-05-16 NOTE — PROGRESS NOTES
Subjective:      Mihai Brandt is a 35 y.o. female who presents with Urinary Frequency (urgency, burning sensation, LLQ pain x 3-4 days)        Is a pleasant 35-year-old female with a history of recurrent UTIs who presents with 4 days of urinary urgency, burning and frequency.  Patient has seen a urologist in the past and was started on Bactrim as needed however she reports significant side effects from the Bactrim and states she actually feels worse taking the medication then she does from the UTI.  She reports she normally gets them after sexual intercourse however she has not been sexually active in the last 2 months since her last UTI.        Dysuria   This is a new problem. Episode onset: x 4 days. The problem occurs every urination. The problem has been gradually worsening. The quality of the pain is described as burning. The pain is moderate. There has been no fever. Associated symptoms include frequency, hesitancy and urgency. Pertinent negatives include no chills, discharge, flank pain, hematuria, nausea, possible pregnancy, sweats or vomiting. She has tried increased fluids for the symptoms. The treatment provided no relief. Her past medical history is significant for recurrent UTIs.       Review of Systems   Constitutional: Negative for chills and malaise/fatigue.   HENT: Negative for ear pain.    Respiratory: Negative for shortness of breath and wheezing.    Cardiovascular: Negative for palpitations, orthopnea and leg swelling.   Gastrointestinal: Negative for constipation, diarrhea, heartburn, nausea and vomiting.   Genitourinary: Positive for dysuria, frequency, hesitancy and urgency. Negative for flank pain and hematuria.   Musculoskeletal: Negative for back pain and joint pain.   Neurological: Negative for dizziness and tingling.   Psychiatric/Behavioral: Negative for memory loss and suicidal ideas.   All other systems reviewed and are negative.         Objective:     /60 (BP Location:  "Right arm, Patient Position: Sitting, BP Cuff Size: Adult)   Pulse 68   Temp 36.8 °C (98.2 °F) (Temporal)   Resp 16   Ht 1.651 m (5' 5\")   Wt 62.1 kg (137 lb)   LMP 05/04/2021   SpO2 92%   BMI 22.80 kg/m²      Physical Exam  Vitals reviewed.   Constitutional:       General: She is not in acute distress.     Appearance: Normal appearance.   HENT:      Head: Normocephalic and atraumatic.      Right Ear: External ear normal.      Left Ear: External ear normal.   Eyes:      Pupils: Pupils are equal, round, and reactive to light.   Cardiovascular:      Rate and Rhythm: Normal rate and regular rhythm.      Pulses: Normal pulses.      Heart sounds: No friction rub. No gallop.    Pulmonary:      Effort: Pulmonary effort is normal. No respiratory distress.      Breath sounds: Normal breath sounds.   Abdominal:      General: Bowel sounds are normal. There is no distension.      Palpations: Abdomen is soft. There is no mass.      Tenderness: There is abdominal tenderness in the suprapubic area. There is no right CVA tenderness or left CVA tenderness.   Musculoskeletal:         General: No tenderness. Normal range of motion.      Cervical back: Normal range of motion and neck supple.      Right lower leg: No edema.      Left lower leg: No edema.   Skin:     General: Skin is warm and dry.   Neurological:      Mental Status: She is alert and oriented to person, place, and time.   Psychiatric:         Mood and Affect: Mood normal.         Behavior: Behavior normal.                           Assessment/Plan:        1. Dysuria  POCT Urinalysis    URINE CULTURE(NEW)    cephALEXin (KEFLEX) 500 MG Cap   2. Acute cystitis without hematuria  phenazopyridine (PYRIDIUM) 200 MG Tab    cephALEXin (KEFLEX) 500 MG Cap   3. History of recurrent UTIs       I have reviewed patient's previous urinalysis and urine culture reports to evaluate for antibiotic resistance.  I have discussed this with patient.  Patient has failed multiple " different antibiotics in the past is having a hard time tolerating Bactrim for her recurrent UTIs.  Patient would like to trial a different antibiotic discussed trial of Keflex today.  We will await urine culture results.  .    Advised probiotics.  Rest, fluids encouraged.  AVS with medical info given.  Pt was in full understanding and agreement with the plan.  Differential diagnosis, natural history, supportive care, and indications for immediate follow-up discussed. All questions answered. Patient agrees with the plan of care.  Follow-up as needed if symptoms worsen or fail to improve.  ER precautions given regarding pyelonephritis including fevers greater than 101 and, vomiting and dehydration, increased back pain.

## 2021-05-18 LAB
BACTERIA UR CULT: NORMAL
SIGNIFICANT IND 70042: NORMAL
SITE SITE: NORMAL
SOURCE SOURCE: NORMAL

## 2021-06-11 ENCOUNTER — HOSPITAL ENCOUNTER (OUTPATIENT)
Dept: LAB | Facility: MEDICAL CENTER | Age: 36
End: 2021-06-11
Attending: FAMILY MEDICINE
Payer: COMMERCIAL

## 2021-06-11 DIAGNOSIS — Z11.1 SCREENING-PULMONARY TB: ICD-10-CM

## 2021-06-11 PROCEDURE — 36415 COLL VENOUS BLD VENIPUNCTURE: CPT

## 2021-06-11 PROCEDURE — 86480 TB TEST CELL IMMUN MEASURE: CPT

## 2021-06-14 ENCOUNTER — TELEPHONE (OUTPATIENT)
Dept: MEDICAL GROUP | Facility: LAB | Age: 36
End: 2021-06-14

## 2021-06-14 DIAGNOSIS — Z11.1 SCREENING-PULMONARY TB: ICD-10-CM

## 2021-06-14 LAB
GAMMA INTERFERON BACKGROUND BLD IA-ACNC: 0.1 IU/ML
M TB IFN-G BLD-IMP: POSITIVE
M TB IFN-G CD4+ BCKGRND COR BLD-ACNC: 2.25 IU/ML
MITOGEN IGNF BCKGRD COR BLD-ACNC: >10 IU/ML
QFT TB2 - NIL TBQ2: 3.25 IU/ML

## 2021-06-14 NOTE — RESULT ENCOUNTER NOTE
Please let patient know that quant gold positive. This is most likely due to the fact that she had been vaccinated as a child. Can we get those vaccination records by chance? Thanks.

## 2021-06-14 NOTE — TELEPHONE ENCOUNTER
Phone call placed to patient, informed of results of Quantiferon Gold. She confirms she was vaccinated against tuberculosis as a child in [place], does not have access to vaccination records. She states a chest xray is typically ordered for screening.     CXR order pended.     Please sign/amend if appropriate.

## 2021-06-15 ENCOUNTER — HOSPITAL ENCOUNTER (OUTPATIENT)
Dept: RADIOLOGY | Facility: MEDICAL CENTER | Age: 36
End: 2021-06-15
Attending: FAMILY MEDICINE
Payer: COMMERCIAL

## 2021-06-15 DIAGNOSIS — Z11.1 SCREENING-PULMONARY TB: ICD-10-CM

## 2021-06-15 PROCEDURE — 71046 X-RAY EXAM CHEST 2 VIEWS: CPT

## 2021-08-24 ENCOUNTER — TELEPHONE (OUTPATIENT)
Dept: MEDICAL GROUP | Facility: LAB | Age: 36
End: 2021-08-24

## 2021-10-13 DIAGNOSIS — E03.9 HYPOTHYROIDISM, UNSPECIFIED TYPE: ICD-10-CM

## 2021-10-13 RX ORDER — LEVOTHYROXINE SODIUM 88 UG/1
TABLET ORAL
Qty: 90 TABLET | Refills: 0 | Status: SHIPPED | OUTPATIENT
Start: 2021-10-13 | End: 2022-06-01 | Stop reason: SDUPTHER

## 2021-10-13 NOTE — TELEPHONE ENCOUNTER
Received request via: Pharmacy    Was the patient seen in the last year in this department? Yes  LOV: 12/14/2020  Does the patient have an active prescription (recently filled or refills available) for medication(s) requested? No

## 2022-06-01 ENCOUNTER — OFFICE VISIT (OUTPATIENT)
Dept: MEDICAL GROUP | Facility: LAB | Age: 37
End: 2022-06-01
Payer: COMMERCIAL

## 2022-06-01 VITALS
HEIGHT: 66 IN | SYSTOLIC BLOOD PRESSURE: 80 MMHG | DIASTOLIC BLOOD PRESSURE: 30 MMHG | WEIGHT: 135 LBS | TEMPERATURE: 98 F | HEART RATE: 89 BPM | BODY MASS INDEX: 21.69 KG/M2 | OXYGEN SATURATION: 97 %

## 2022-06-01 DIAGNOSIS — E03.9 HYPOTHYROIDISM, UNSPECIFIED TYPE: ICD-10-CM

## 2022-06-01 DIAGNOSIS — Z00.00 ANNUAL PHYSICAL EXAM: ICD-10-CM

## 2022-06-01 DIAGNOSIS — Z13.6 SCREENING FOR CARDIOVASCULAR CONDITION: ICD-10-CM

## 2022-06-01 DIAGNOSIS — N39.0 CHRONIC UTI: ICD-10-CM

## 2022-06-01 PROCEDURE — 99395 PREV VISIT EST AGE 18-39: CPT | Performed by: FAMILY MEDICINE

## 2022-06-01 RX ORDER — LEVOTHYROXINE SODIUM 88 UG/1
TABLET ORAL
Qty: 90 TABLET | Refills: 3 | Status: SHIPPED | OUTPATIENT
Start: 2022-06-01 | End: 2023-09-06

## 2022-06-01 RX ORDER — SULFAMETHOXAZOLE AND TRIMETHOPRIM 800; 160 MG/1; MG/1
1 TABLET ORAL 2 TIMES DAILY
Qty: 60 TABLET | Refills: 0 | Status: SHIPPED | OUTPATIENT
Start: 2022-06-01 | End: 2022-07-01

## 2022-06-01 RX ORDER — SULFAMETHOXAZOLE AND TRIMETHOPRIM 800; 160 MG/1; MG/1
TABLET ORAL
COMMUNITY
End: 2022-06-01 | Stop reason: SDUPTHER

## 2022-06-01 ASSESSMENT — ENCOUNTER SYMPTOMS
CHILLS: 0
DIARRHEA: 0
DEPRESSION: 0
NAUSEA: 0
VOMITING: 0
DIZZINESS: 0
NERVOUS/ANXIOUS: 0
BLURRED VISION: 0
PALPITATIONS: 0
CONSTIPATION: 0
FEVER: 0
WHEEZING: 0
HEADACHES: 0
SHORTNESS OF BREATH: 0
ABDOMINAL PAIN: 0

## 2022-06-01 ASSESSMENT — PATIENT HEALTH QUESTIONNAIRE - PHQ9: CLINICAL INTERPRETATION OF PHQ2 SCORE: 0

## 2022-06-01 NOTE — PROGRESS NOTES
Subjective:   Mihai Brandt is a 36 y.o. female here today for   Chief Complaint   Patient presents with   • Medication Refill       #Annual   -Reviewed all past medical history, family history, social history.  -Reviewed all screening/vaccinations: Due for second dose of COVID-19 vaccine.  Also due for labs  -Diet and Exercise: Appropriate for age  -Tobacco, alcohol, recreational drug use: No changes, see chart  -Sexually active: No changes, see chart    #hypothyroid   -Chronic longstanding condition currently treating with levothyroxine 88 mcg daily.  She has been out of medication has not been taking it for the last 2 weeks; however, patient states that she is feeling well and is asymptomatic.  Here today to request refill of labs.    #Chronic UTI:  -Patient states that she has been followed by urology for chronic UTIs.  She is mostly symptomatic when traveling.  Patient is requesting refill of Bactrim as she waits to follow-up with urology.        No Known Allergies      Current medicines (including changes today)  Current Outpatient Medications   Medication Sig Dispense Refill   • levothyroxine (EUTHYROX) 88 MCG Tab TAKE 1 TABLET BY MOUTH IN THE MORNING ON AN EMPTY STOMACH 90 Tablet 0   • phenazopyridine (PYRIDIUM) 200 MG Tab Take 1 tablet by mouth 3 times a day as needed. 3 tablet 0     No current facility-administered medications for this visit.     She  has a past medical history of Anemia, Anesthesia, Indigestion,  product of in vitro fertilization (IVF) pregnancy, Pain, PCOD (polycystic ovarian disease), Pregnant, and Thyroid disease.    ROS   Review of Systems   Constitutional: Negative for chills and fever.   HENT: Negative for hearing loss.    Eyes: Negative for blurred vision.   Respiratory: Negative for shortness of breath and wheezing.    Cardiovascular: Negative for chest pain and palpitations.   Gastrointestinal: Negative for abdominal pain, constipation, diarrhea, nausea and vomiting.  "  Neurological: Negative for dizziness and headaches.   Psychiatric/Behavioral: Negative for depression. The patient is not nervous/anxious.         Objective:     Physical Exam:  BP (!) 80/30 (BP Location: Right arm, Patient Position: Sitting, BP Cuff Size: Adult)   Pulse 89   Temp 36.7 °C (98 °F) (Temporal)   Ht 1.676 m (5' 6\")   Wt 61.2 kg (135 lb)   SpO2 97%  Body mass index is 21.79 kg/m².   Constitutional: Alert, no distress.  Skin: Warm, dry, good turgor, no rashes in visible areas.  Eye: Equal, round and reactive, conjunctiva clear, lids normal.  Neck: Trachea midline, no masses, no thyromegaly. No cervical or supraclavicular lymphadenopathy.  Respiratory: Unlabored respiratory effort, lungs clear to auscultation, no wheezes, no rhonchi.  Cardiovascular: Normal S1, S2, no murmur, no edema.  Abdomen: Soft, non-tender, no masses, no hepatosplenomegaly.  Psych: Alert and oriented x3, normal affect and mood.    Assessment and Plan:     1. Annual physical exam  -All questions concerns were answered at this time.  -Vaccinations/screenings needed at this time: We will complete labs as below.  Encourage patient to follow-up with COVID-19 vaccine.  -Labs reviewed, no concerns, repeat labs as below.  -Discussed the importance of a healthy, Mediterranean style diet, routine exercise regimen.  -Discussed general safety measures including seatbelts, helmets, avoidance of smoking, sunscreen, hydration.  -Follow-up for general physical exam on a yearly basis, sooner if needed.  - CBC WITHOUT DIFFERENTIAL; Future  - Comp Metabolic Panel; Future    2. Hypothyroidism, unspecified type  -We will refill levothyroxine 8 mcg.  Patient will begin taking medication and continue for 1 month at which point she will complete thyroid labs to assure normal TSH.  We will follow-up with patient with these results and titrate medication if needed.  - levothyroxine (EUTHYROX) 88 MCG Tab; TAKE 1 TABLET BY MOUTH IN THE MORNING ON AN " EMPTY STOMACH  Dispense: 90 Tablet; Refill: 3  - TSH WITH REFLEX TO FT4; Future  - CBC WITHOUT DIFFERENTIAL; Future  - Comp Metabolic Panel; Future    3. Chronic UTI  -Stable.  Refill Bactrim.  Encourage patient to follow-up with urology.  - sulfamethoxazole-trimethoprim (BACTRIM DS) 800-160 MG tablet; Take 1 Tablet by mouth 2 times a day for 30 days.  Dispense: 60 Tablet; Refill: 0    4. Screening for cardiovascular condition  - Lipid Profile; Future      Followup: No follow-ups on file.         PLEASE NOTE: This dictation was created using voice recognition software. I have made every reasonable attempt to correct obvious errors, but I expect that there are errors of grammar and possibly content that I did not discover before finalizing the note.

## 2022-06-09 ENCOUNTER — HOSPITAL ENCOUNTER (OUTPATIENT)
Dept: LAB | Facility: MEDICAL CENTER | Age: 37
End: 2022-06-09
Attending: FAMILY MEDICINE
Payer: COMMERCIAL

## 2022-06-09 DIAGNOSIS — E03.9 HYPOTHYROIDISM, UNSPECIFIED TYPE: ICD-10-CM

## 2022-06-09 DIAGNOSIS — Z13.6 SCREENING FOR CARDIOVASCULAR CONDITION: ICD-10-CM

## 2022-06-09 DIAGNOSIS — Z00.00 ANNUAL PHYSICAL EXAM: ICD-10-CM

## 2022-06-09 LAB
ALBUMIN SERPL BCP-MCNC: 4.6 G/DL (ref 3.2–4.9)
ALBUMIN/GLOB SERPL: 1.5 G/DL
ALP SERPL-CCNC: 57 U/L (ref 30–99)
ALT SERPL-CCNC: 11 U/L (ref 2–50)
ANION GAP SERPL CALC-SCNC: 11 MMOL/L (ref 7–16)
AST SERPL-CCNC: 16 U/L (ref 12–45)
BILIRUB SERPL-MCNC: 0.5 MG/DL (ref 0.1–1.5)
BUN SERPL-MCNC: 15 MG/DL (ref 8–22)
CALCIUM SERPL-MCNC: 9 MG/DL (ref 8.5–10.5)
CHLORIDE SERPL-SCNC: 106 MMOL/L (ref 96–112)
CHOLEST SERPL-MCNC: 192 MG/DL (ref 100–199)
CO2 SERPL-SCNC: 24 MMOL/L (ref 20–33)
CREAT SERPL-MCNC: 0.81 MG/DL (ref 0.5–1.4)
ERYTHROCYTE [DISTWIDTH] IN BLOOD BY AUTOMATED COUNT: 41.3 FL (ref 35.9–50)
GFR SERPLBLD CREATININE-BSD FMLA CKD-EPI: 97 ML/MIN/1.73 M 2
GLOBULIN SER CALC-MCNC: 3.1 G/DL (ref 1.9–3.5)
GLUCOSE SERPL-MCNC: 90 MG/DL (ref 65–99)
HCT VFR BLD AUTO: 39.6 % (ref 37–47)
HDLC SERPL-MCNC: 71 MG/DL
HGB BLD-MCNC: 13.1 G/DL (ref 12–16)
LDLC SERPL CALC-MCNC: 109 MG/DL
MCH RBC QN AUTO: 31.8 PG (ref 27–33)
MCHC RBC AUTO-ENTMCNC: 33.1 G/DL (ref 33.6–35)
MCV RBC AUTO: 96.1 FL (ref 81.4–97.8)
PLATELET # BLD AUTO: 306 K/UL (ref 164–446)
PMV BLD AUTO: 10.4 FL (ref 9–12.9)
POTASSIUM SERPL-SCNC: 4.2 MMOL/L (ref 3.6–5.5)
PROT SERPL-MCNC: 7.7 G/DL (ref 6–8.2)
RBC # BLD AUTO: 4.12 M/UL (ref 4.2–5.4)
SODIUM SERPL-SCNC: 141 MMOL/L (ref 135–145)
TRIGL SERPL-MCNC: 58 MG/DL (ref 0–149)
TSH SERPL DL<=0.005 MIU/L-ACNC: 1.94 UIU/ML (ref 0.38–5.33)
WBC # BLD AUTO: 5.5 K/UL (ref 4.8–10.8)

## 2022-06-09 PROCEDURE — 85027 COMPLETE CBC AUTOMATED: CPT

## 2022-06-09 PROCEDURE — 80053 COMPREHEN METABOLIC PANEL: CPT

## 2022-06-09 PROCEDURE — 36415 COLL VENOUS BLD VENIPUNCTURE: CPT

## 2022-06-09 PROCEDURE — 80061 LIPID PANEL: CPT

## 2022-06-09 PROCEDURE — 84443 ASSAY THYROID STIM HORMONE: CPT

## 2023-02-01 ENCOUNTER — TELEMEDICINE (OUTPATIENT)
Dept: MEDICAL GROUP | Facility: LAB | Age: 38
End: 2023-02-01
Payer: COMMERCIAL

## 2023-02-01 ENCOUNTER — PATIENT MESSAGE (OUTPATIENT)
Dept: MEDICAL GROUP | Facility: LAB | Age: 38
End: 2023-02-01

## 2023-02-01 VITALS — WEIGHT: 135 LBS | HEIGHT: 66 IN | BODY MASS INDEX: 21.69 KG/M2

## 2023-02-01 DIAGNOSIS — R10.2 PELVIC PAIN: ICD-10-CM

## 2023-02-01 DIAGNOSIS — N80.9 ENDOMETRIOSIS: ICD-10-CM

## 2023-02-01 DIAGNOSIS — E03.9 HYPOTHYROIDISM, UNSPECIFIED TYPE: ICD-10-CM

## 2023-02-01 DIAGNOSIS — Z01.89 PATIENT REQUESTED DIAGNOSTIC TESTING: ICD-10-CM

## 2023-02-01 PROCEDURE — 99214 OFFICE O/P EST MOD 30 MIN: CPT | Mod: 95 | Performed by: FAMILY MEDICINE

## 2023-02-01 ASSESSMENT — PATIENT HEALTH QUESTIONNAIRE - PHQ9: CLINICAL INTERPRETATION OF PHQ2 SCORE: 0

## 2023-02-01 ASSESSMENT — FIBROSIS 4 INDEX: FIB4 SCORE: 0.57

## 2023-02-01 NOTE — PROGRESS NOTES
Virtual Visit: Established Patient   This visit was conducted via Zoom using secure and encrypted videoconferencing technology.   The patient was in their home in the state of [place].    The patient's identity was confirmed and verbal consent was obtained for this virtual visit.     Subjective:   CC:   Chief Complaint   Patient presents with    Annual Exam    Requesting Labs    Endometriosis     X  discuss testing and        Mihai Brandt is a 37 y.o. female presenting for evaluation and management of:      #Hypothyroid   -Once in history currently treating with levothyroxine 88 mcg.  Is compliant with medication.  Does state recently she has noticed some concerning symptoms including increased pelvic pain (see below), increased fatigue.  Denies any hot or cold intolerance, changes to her nails, diarrhea, constipation.    #Endometriosis   -Diagnosed back in 2015 after laparoscopic surgery completed.  For the most part symptoms have been stable until recently over the last several months she has noticed increased pelvic pain 1 to 2 weeks before her menstrual cycle.  She describes it as a sharp pain with occasional cramping located both left and right lower quadrants of the abdomen.  She states that with these symptoms she is also developed some emotional lability, fatigue also associated with menstrual cycle.  Denies any dysuria, fevers, chills, nausea, vomiting.      ROS   -Denies any fevers, chills, chest pain, shortness of breath    Current medicines (including changes today)  Current Outpatient Medications   Medication Sig Dispense Refill    levothyroxine (EUTHYROX) 88 MCG Tab TAKE 1 TABLET BY MOUTH IN THE MORNING ON AN EMPTY STOMACH 90 Tablet 3    phenazopyridine (PYRIDIUM) 200 MG Tab Take 1 tablet by mouth 3 times a day as needed. 3 tablet 0     No current facility-administered medications for this visit.       Patient Active Problem List    Diagnosis Date Noted    Acne vulgaris 10/22/2019    History of  "PCOS 01/04/2019    Hypothyroidism 01/04/2019        Objective:   Ht 1.676 m (5' 6\")   Wt 61.2 kg (135 lb)   BMI 21.79 kg/m²     Physical Exam:  Constitutional: Alert, no distress, well-groomed.  Skin: No rashes in visible areas.  Eye: Round. Conjunctiva clear, lids normal. No icterus.   ENMT: Lips pink without lesions, good dentition, moist mucous membranes. Phonation normal.  Neck: No masses, no thyromegaly. Moves freely without pain.  Respiratory: Unlabored respiratory effort, no cough or audible wheeze  Psych: Alert and oriented x3, normal affect and mood.     Assessment and Plan:   The following treatment plan was discussed:     1. Hypothyroidism, unspecified type  -At this time, patient experiencing some symptoms which could be concerning for worsening hypothyroidism.  We will recheck labs including TSH and titrate medication if needed.  - CBC WITHOUT DIFFERENTIAL; Future  - Comp Metabolic Panel; Future  - TSH WITH REFLEX TO FT4; Future    2. Endometriosis  -Patient has a reoccurrence of cold-like symptoms including sharp pelvic pain.  This could be associated with previous diagnosis of endometriosis.  We will place an order for pelvic ultrasound as well as complete labs as below.  Given that the symptoms have recurred we will also refer to gynecology for further evaluation and treatment if needed.  - US-PELVIC COMPLETE (TRANSABDOMINAL/TRANSVAGINAL) (COMBO); Future  - CBC WITHOUT DIFFERENTIAL; Future  - Comp Metabolic Panel; Future  - TSH WITH REFLEX TO FT4; Future  - Referral to Gynecology    3. Pelvic pain  -See #2.  -Discussed the use of anti-inflammatories as needed for pelvic pain.  - US-PELVIC COMPLETE (TRANSABDOMINAL/TRANSVAGINAL) (COMBO); Future  - Referral to Gynecology      Follow-up: No follow-ups on file.         "

## 2023-02-06 ENCOUNTER — HOSPITAL ENCOUNTER (OUTPATIENT)
Dept: LAB | Facility: MEDICAL CENTER | Age: 38
End: 2023-02-06
Attending: FAMILY MEDICINE
Payer: COMMERCIAL

## 2023-02-06 DIAGNOSIS — N80.9 ENDOMETRIOSIS: ICD-10-CM

## 2023-02-06 DIAGNOSIS — Z01.89 PATIENT REQUESTED DIAGNOSTIC TESTING: ICD-10-CM

## 2023-02-06 DIAGNOSIS — E03.9 HYPOTHYROIDISM, UNSPECIFIED TYPE: ICD-10-CM

## 2023-02-06 LAB
25(OH)D3 SERPL-MCNC: 44 NG/ML (ref 30–100)
ALBUMIN SERPL BCP-MCNC: 4.9 G/DL (ref 3.2–4.9)
ALBUMIN/GLOB SERPL: 1.6 G/DL
ALP SERPL-CCNC: 47 U/L (ref 30–99)
ALT SERPL-CCNC: 15 U/L (ref 2–50)
ANION GAP SERPL CALC-SCNC: 14 MMOL/L (ref 7–16)
AST SERPL-CCNC: 32 U/L (ref 12–45)
BILIRUB SERPL-MCNC: 0.8 MG/DL (ref 0.1–1.5)
BUN SERPL-MCNC: 10 MG/DL (ref 8–22)
CALCIUM ALBUM COR SERPL-MCNC: 9.2 MG/DL (ref 8.5–10.5)
CALCIUM SERPL-MCNC: 9.9 MG/DL (ref 8.5–10.5)
CHLORIDE SERPL-SCNC: 102 MMOL/L (ref 96–112)
CO2 SERPL-SCNC: 24 MMOL/L (ref 20–33)
CREAT SERPL-MCNC: 0.71 MG/DL (ref 0.5–1.4)
ERYTHROCYTE [DISTWIDTH] IN BLOOD BY AUTOMATED COUNT: 42.5 FL (ref 35.9–50)
GFR SERPLBLD CREATININE-BSD FMLA CKD-EPI: 113 ML/MIN/1.73 M 2
GLOBULIN SER CALC-MCNC: 3.1 G/DL (ref 1.9–3.5)
GLUCOSE SERPL-MCNC: 78 MG/DL (ref 65–99)
HCT VFR BLD AUTO: 42.3 % (ref 37–47)
HGB BLD-MCNC: 13.9 G/DL (ref 12–16)
MCH RBC QN AUTO: 33.6 PG (ref 27–33)
MCHC RBC AUTO-ENTMCNC: 32.9 G/DL (ref 33.6–35)
MCV RBC AUTO: 102.2 FL (ref 81.4–97.8)
PLATELET # BLD AUTO: 293 K/UL (ref 164–446)
PMV BLD AUTO: 10.6 FL (ref 9–12.9)
POTASSIUM SERPL-SCNC: 4.1 MMOL/L (ref 3.6–5.5)
PROT SERPL-MCNC: 8 G/DL (ref 6–8.2)
RBC # BLD AUTO: 4.14 M/UL (ref 4.2–5.4)
SODIUM SERPL-SCNC: 140 MMOL/L (ref 135–145)
TSH SERPL DL<=0.005 MIU/L-ACNC: 4.45 UIU/ML (ref 0.38–5.33)
WBC # BLD AUTO: 4.1 K/UL (ref 4.8–10.8)

## 2023-02-06 PROCEDURE — 36415 COLL VENOUS BLD VENIPUNCTURE: CPT

## 2023-02-06 PROCEDURE — 85027 COMPLETE CBC AUTOMATED: CPT

## 2023-02-06 PROCEDURE — 82306 VITAMIN D 25 HYDROXY: CPT

## 2023-02-06 PROCEDURE — 84443 ASSAY THYROID STIM HORMONE: CPT

## 2023-02-06 PROCEDURE — 80053 COMPREHEN METABOLIC PANEL: CPT

## 2023-02-08 ENCOUNTER — OFFICE VISIT (OUTPATIENT)
Dept: MEDICAL GROUP | Facility: LAB | Age: 38
End: 2023-02-08
Payer: COMMERCIAL

## 2023-02-08 VITALS
RESPIRATION RATE: 15 BRPM | TEMPERATURE: 97.8 F | WEIGHT: 135 LBS | HEART RATE: 75 BPM | HEIGHT: 66 IN | DIASTOLIC BLOOD PRESSURE: 64 MMHG | BODY MASS INDEX: 21.69 KG/M2 | OXYGEN SATURATION: 98 % | SYSTOLIC BLOOD PRESSURE: 102 MMHG

## 2023-02-08 DIAGNOSIS — R53.83 OTHER FATIGUE: ICD-10-CM

## 2023-02-08 DIAGNOSIS — R71.8 ELEVATED MCV: ICD-10-CM

## 2023-02-08 DIAGNOSIS — R06.02 SOB (SHORTNESS OF BREATH): ICD-10-CM

## 2023-02-08 PROCEDURE — 99214 OFFICE O/P EST MOD 30 MIN: CPT | Performed by: FAMILY MEDICINE

## 2023-02-08 ASSESSMENT — FIBROSIS 4 INDEX: FIB4 SCORE: 1.02

## 2023-02-08 NOTE — PROGRESS NOTES
"Subjective:   Mihai Brandt is a 37 y.o. female here today for   No chief complaint on file.      #Fatigue:  -Patient here to follow-up on symptoms of fatigue.  Has been ongoing for last several months.  She also states that she started noticing some exercise intolerance, shortness of breath as well.  -Patient has a significant medical history of anemia when she was pregnant.  We recently completed labs showing an elevated MCV and decreased RBC, decreased MCHC.  Patient is currently taking multivitamin.  -Patient also is a history of QuantiFERON gold that was positive back in .  Did have x-ray completed at that time which was negative.  Patient denies any cough, fevers, abdominal pain, weight loss.      No Known Allergies      Current medicines (including changes today)  Current Outpatient Medications   Medication Sig Dispense Refill    levothyroxine (EUTHYROX) 88 MCG Tab TAKE 1 TABLET BY MOUTH IN THE MORNING ON AN EMPTY STOMACH 90 Tablet 3    phenazopyridine (PYRIDIUM) 200 MG Tab Take 1 tablet by mouth 3 times a day as needed. 3 tablet 0     No current facility-administered medications for this visit.     She  has a past medical history of Anemia, Anesthesia, Indigestion, [place] product of in vitro fertilization (IVF) pregnancy, Pain, PCOD (polycystic ovarian disease), Pregnant, and Thyroid disease.    ROS   -See HPI       Objective:     Physical Exam:  /64   Pulse 75   Temp 36.6 °C (97.8 °F) (Temporal)   Resp 15   Ht 1.676 m (5' 5.98\")   Wt 61.2 kg (135 lb)   SpO2 98%  Body mass index is 21.8 kg/m².   Constitutional: Alert, no distress.  Skin: Warm, dry, good turgor, no rashes in visible areas.  Eye: Equal, round and reactive, conjunctiva clear, lids normal.  Respiratory: Unlabored respiratory effort, lungs clear to auscultation, no wheezes, no rhonchi.  Cardiovascular: Normal S1, S2, no murmur, no edema.  Psych: Alert and oriented x3, normal affect and mood.    Assessment and Plan:     1. " Elevated MCV  -With elevated MCV I am concerned about the potential beginnings of an anemia.  We will check vitamin B12 levels and folate to rule out any vitamin deficiency, pernicious anemia, or otherwise.  Did encourage patient to begin folic acid, vitamin B12 supplementation.  - VITAMIN B12; Future  - FOLATE; Future    2. Other fatigue  -Differential diagnosis is broad; however, concerning symptoms at this time with shortness of breath, exercise intolerance we will complete chest x-ray to rule out any latent TB.  We will also check vitamin B12 and folate levels as above.  - VITAMIN B12; Future  - FOLATE; Future  - DX-CHEST-2 VIEWS; Future    3. SOB (shortness of breath)  -Rule out any pulmonary causes including latent TB to current shortness of breath and fatigue.  We will follow-up with patient with results.  - DX-CHEST-2 VIEWS; Future    Followup: No follow-ups on file.         PLEASE NOTE: This dictation was created using voice recognition software. I have made every reasonable attempt to correct obvious errors, but I expect that there are errors of grammar and possibly content that I did not discover before finalizing the note.

## 2023-02-09 ENCOUNTER — HOSPITAL ENCOUNTER (OUTPATIENT)
Dept: LAB | Facility: MEDICAL CENTER | Age: 38
End: 2023-02-09
Attending: FAMILY MEDICINE
Payer: COMMERCIAL

## 2023-02-09 DIAGNOSIS — R53.83 OTHER FATIGUE: ICD-10-CM

## 2023-02-09 DIAGNOSIS — R71.8 ELEVATED MCV: ICD-10-CM

## 2023-02-09 PROCEDURE — 82746 ASSAY OF FOLIC ACID SERUM: CPT

## 2023-02-09 PROCEDURE — 82607 VITAMIN B-12: CPT

## 2023-02-09 PROCEDURE — 36415 COLL VENOUS BLD VENIPUNCTURE: CPT

## 2023-02-10 LAB
FOLATE SERPL-MCNC: 26.2 NG/ML
VIT B12 SERPL-MCNC: >4000 PG/ML (ref 211–911)

## 2023-09-19 ENCOUNTER — OFFICE VISIT (OUTPATIENT)
Dept: URGENT CARE | Facility: CLINIC | Age: 38
End: 2023-09-19
Payer: COMMERCIAL

## 2023-09-19 VITALS
DIASTOLIC BLOOD PRESSURE: 60 MMHG | HEART RATE: 98 BPM | SYSTOLIC BLOOD PRESSURE: 106 MMHG | TEMPERATURE: 99.3 F | OXYGEN SATURATION: 98 % | HEIGHT: 66 IN | WEIGHT: 134 LBS | BODY MASS INDEX: 21.53 KG/M2 | RESPIRATION RATE: 16 BRPM

## 2023-09-19 DIAGNOSIS — J02.0 STREP PHARYNGITIS: ICD-10-CM

## 2023-09-19 LAB — S PYO DNA SPEC NAA+PROBE: DETECTED

## 2023-09-19 PROCEDURE — 87651 STREP A DNA AMP PROBE: CPT | Performed by: REGISTERED NURSE

## 2023-09-19 PROCEDURE — 3074F SYST BP LT 130 MM HG: CPT | Performed by: REGISTERED NURSE

## 2023-09-19 PROCEDURE — 3078F DIAST BP <80 MM HG: CPT | Performed by: REGISTERED NURSE

## 2023-09-19 PROCEDURE — 93000 ELECTROCARDIOGRAM COMPLETE: CPT | Performed by: REGISTERED NURSE

## 2023-09-19 PROCEDURE — 99213 OFFICE O/P EST LOW 20 MIN: CPT | Performed by: REGISTERED NURSE

## 2023-09-19 RX ORDER — DEXAMETHASONE SODIUM PHOSPHATE 10 MG/ML
10 INJECTION INTRAMUSCULAR; INTRAVENOUS ONCE
Status: COMPLETED | OUTPATIENT
Start: 2023-09-19 | End: 2023-09-19

## 2023-09-19 RX ORDER — AMOXICILLIN 500 MG/1
500 CAPSULE ORAL 2 TIMES DAILY
Qty: 20 CAPSULE | Refills: 0 | Status: SHIPPED | OUTPATIENT
Start: 2023-09-19 | End: 2023-09-29

## 2023-09-19 RX ADMIN — DEXAMETHASONE SODIUM PHOSPHATE 10 MG: 10 INJECTION INTRAMUSCULAR; INTRAVENOUS at 17:24

## 2023-09-19 ASSESSMENT — ENCOUNTER SYMPTOMS
ABDOMINAL PAIN: 0
COUGH: 0
HEADACHES: 0
FEVER: 0
DIZZINESS: 0

## 2023-09-19 ASSESSMENT — FIBROSIS 4 INDEX: FIB4 SCORE: 1.04

## 2023-09-19 NOTE — PROGRESS NOTES
"Subjective:   Mihai Brandt is a 38 y.o. female who presents for Fever (X1 week, \"Chills, headache, tightness in chest, feelings of not being able to take a full breath in.\" )    HPI  7 days of fever tmax 102 which has since resolved, chills, sore throat, chest tightness and a feeling of difficulty taking a full breath. Denies chest pain. No underlying heart or lung issues. Using occasional OTC medications but nothing consistently.  No confirmed exposures.  Is tolerating p.o.  Denies pregnancy.    Review of Systems   Constitutional:  Negative for fever.   HENT:  Negative for congestion.    Respiratory:  Negative for cough.    Cardiovascular:  Negative for chest pain.   Gastrointestinal:  Negative for abdominal pain.   Skin:  Negative for rash.   Neurological:  Negative for dizziness and headaches.       Medications, Allergies, and current problem list reviewed today in Epic.     Objective:     /60 (BP Location: Left arm, Patient Position: Sitting, BP Cuff Size: Adult)   Pulse 98   Temp 37.4 °C (99.3 °F) (Temporal)   Resp 16   Ht 1.676 m (5' 6\")   Wt 60.8 kg (134 lb)   SpO2 98%     Physical Exam  Vitals and nursing note reviewed.   Constitutional:       General: She is not in acute distress.     Appearance: Normal appearance. She is well-developed. She is not ill-appearing, toxic-appearing or diaphoretic.   HENT:      Head: Normocephalic and atraumatic.      Right Ear: Tympanic membrane, ear canal and external ear normal.      Left Ear: Tympanic membrane, ear canal and external ear normal.      Nose: Nose normal. No congestion or rhinorrhea.      Mouth/Throat:      Mouth: Mucous membranes are moist.      Pharynx: Posterior oropharyngeal erythema present. No oropharyngeal exudate.   Eyes:      General:         Right eye: No discharge.         Left eye: No discharge.      Conjunctiva/sclera: Conjunctivae normal.   Cardiovascular:      Rate and Rhythm: Normal rate and regular rhythm.      Pulses: Normal " pulses.      Heart sounds: Normal heart sounds.   Pulmonary:      Effort: Pulmonary effort is normal. No respiratory distress.      Breath sounds: Normal breath sounds. No wheezing, rhonchi or rales.   Musculoskeletal:      Cervical back: Normal range of motion and neck supple. No rigidity or tenderness.      Right lower leg: No edema.      Left lower leg: No edema.   Lymphadenopathy:      Cervical: Cervical adenopathy present.   Skin:     General: Skin is warm and dry.   Neurological:      General: No focal deficit present.      Mental Status: She is alert and oriented to person, place, and time. Mental status is at baseline.   Psychiatric:         Mood and Affect: Mood normal.         Behavior: Behavior normal.         Thought Content: Thought content normal.         Judgment: Judgment normal.       EKG sinus rhythm rate of 84, normal CAMRON, normal axis, no ST elevation.    Assessment/Plan:     Diagnosis and associated orders:     1. Strep pharyngitis  POCT GROUP A STREP, PCR    EKG - Clinic Performed    dexamethasone (Decadron) injection (check route below) 10 mg    amoxicillin (AMOXIL) 500 MG Cap    CANCELED: POCT CoV-2, Flu A/B, RSV by PCR           Comments/MDM:   Differential diagnosis discussed     Non-toxic appearance  Cepheid strep positive  Thankfully vital signs are reassuring, on exam they are well-appearing, managing oral secretions, talking in full sentences with clear speech, posterior OP is erythemic, uvula midline, no stridor, normal neck range of motion, no adventitious heart or lung sounds, and the remainder of exam is benign without red flag signs or symptoms  10 mg dexamethasone given p.o. in clinic  Discussed throwing away toothbrush after 48 hours on antibiotic  Comfort measures include OTC analgesics, salt water gargling, throat lozenges  Adequate hydration  Follow-up with PCP    Return to clinic or go to ED if symptoms worsen or persist. Indications for ED discussed at length.  Patient/Parent/Guardian voices understanding. Follow-up with your primary care provider in 3-5 days. Red flag symptoms discussed. All side effects of medication discussed including allergic response, GI upset, tendon injury, rash, sedation etc.    I personally reviewed prior external notes and test results pertinent to today's visit as well as additional imaging and testing completed in clinic today.     Please note that this dictation was created using voice recognition software. I have made every reasonable attempt to correct obvious errors, but I expect that there are errors of grammar and possibly content that I did not discover before finalizing the note.    This note was electronically signed by RAHAT Siegel

## 2023-11-28 ENCOUNTER — OFFICE VISIT (OUTPATIENT)
Dept: URGENT CARE | Facility: CLINIC | Age: 38
End: 2023-11-28
Payer: COMMERCIAL

## 2023-11-28 ENCOUNTER — HOSPITAL ENCOUNTER (OUTPATIENT)
Facility: MEDICAL CENTER | Age: 38
End: 2023-11-28
Attending: NURSE PRACTITIONER
Payer: COMMERCIAL

## 2023-11-28 VITALS
BODY MASS INDEX: 21.53 KG/M2 | HEIGHT: 66 IN | WEIGHT: 134 LBS | RESPIRATION RATE: 16 BRPM | DIASTOLIC BLOOD PRESSURE: 66 MMHG | HEART RATE: 72 BPM | SYSTOLIC BLOOD PRESSURE: 104 MMHG | TEMPERATURE: 98.6 F | OXYGEN SATURATION: 97 %

## 2023-11-28 DIAGNOSIS — N89.8 VAGINAL IRRITATION: ICD-10-CM

## 2023-11-28 LAB
CANDIDA DNA VAG QL PROBE+SIG AMP: POSITIVE
G VAGINALIS DNA VAG QL PROBE+SIG AMP: NEGATIVE
T VAGINALIS DNA VAG QL PROBE+SIG AMP: NEGATIVE

## 2023-11-28 PROCEDURE — 99213 OFFICE O/P EST LOW 20 MIN: CPT | Performed by: NURSE PRACTITIONER

## 2023-11-28 PROCEDURE — 87480 CANDIDA DNA DIR PROBE: CPT

## 2023-11-28 PROCEDURE — 87510 GARDNER VAG DNA DIR PROBE: CPT

## 2023-11-28 PROCEDURE — 3078F DIAST BP <80 MM HG: CPT | Performed by: NURSE PRACTITIONER

## 2023-11-28 PROCEDURE — 3074F SYST BP LT 130 MM HG: CPT | Performed by: NURSE PRACTITIONER

## 2023-11-28 PROCEDURE — 87660 TRICHOMONAS VAGIN DIR PROBE: CPT

## 2023-11-28 RX ORDER — FLUCONAZOLE 150 MG/1
TABLET ORAL
Qty: 2 TABLET | Refills: 0 | Status: SHIPPED | OUTPATIENT
Start: 2023-11-28

## 2023-11-28 ASSESSMENT — FIBROSIS 4 INDEX: FIB4 SCORE: 1.04

## 2023-11-28 NOTE — PROGRESS NOTES
"Date: 11/28/23     Chief Complaint:    Chief Complaint   Patient presents with    Vaginal Itching     Started Wednesday, \"Redness, white discharge, pain with urination. I tried OTC yeast infection medication it did not help at all. Symptoms are getting progressively worse.\"         History of Present Illness: 38 y.o.  female presents to clinic with 6-day history of vaginal itching irritation, thick white discharge and pain with urination when the urine touches the vaginal tissue.  She states she has had a urinary tract infection prior and denies that this feels similar.  She denies any burning with urination urinary frequency urgency.  She denies any flank pain.  She denies possibility of pregnancy last menstrual period 2 weeks ago.  She denies possibility STI STD.  She denies any fevers or body aches.  No nausea vomiting diarrhea      ROS:    As stated in HPI     Medical/SX/ Social History:  Reviewed per chart    Pertinent Medications:    Current Outpatient Medications on File Prior to Visit   Medication Sig Dispense Refill    levothyroxine (SYNTHROID) 88 MCG Tab TAKE 1 TABLET BY MOUTH IN THE MORNING ON AN EMPTY STOMACH 90 Tablet 1    phenazopyridine (PYRIDIUM) 200 MG Tab Take 1 tablet by mouth 3 times a day as needed. (Patient not taking: Reported on 9/19/2023) 3 tablet 0     No current facility-administered medications on file prior to visit.        Allergies:    Patient has no known allergies.     Problem list, medications, and allergies reviewed by myself today in Epic     Physical Exam:    Vitals:    11/28/23 1118   BP: 104/66   Pulse: 72   Resp: 16   Temp: 37 °C (98.6 °F)   SpO2: 97%             Physical Exam  Constitutional:       General: She is not in acute distress.     Appearance: Normal appearance. She is well-developed. She is not ill-appearing or toxic-appearing.   HENT:      Head: Normocephalic and atraumatic.   Cardiovascular:      Rate and Rhythm: Normal rate and regular rhythm.      Heart sounds: " Normal heart sounds.   Pulmonary:      Effort: Pulmonary effort is normal. No respiratory distress.      Breath sounds: Normal breath sounds. No wheezing.   Abdominal:      General: Abdomen is flat. Bowel sounds are normal.      Palpations: Abdomen is soft.      Tenderness: There is no abdominal tenderness. There is no right CVA tenderness, left CVA tenderness, guarding or rebound.   Skin:     General: Skin is warm.      Capillary Refill: Capillary refill takes less than 2 seconds.      Coloration: Skin is not cyanotic or pale.   Neurological:      Mental Status: She is alert and oriented to person, place, and time.      Gait: Gait is intact.   Psychiatric:         Behavior: Behavior normal. Behavior is cooperative.                  Diagnostics:      Vaginal pathogen swab pending      Medical Decision making and plan :  I personally reviewed prior external notes and test results pertinent to today's visit. Pt is clinically stable at today's acute urgent care visit.  Patient appears nontoxic with no acute distress noted. Appropriate for outpatient care at this time. The patient remained stable during the urgent care visit.     Pleasant 38 y.o. female presented clinic with HPI exam findings consistent with likely vaginal yeast infection.  Patient denies any foul-smelling discharge.  Did obtain a vaginal pathogen swab which is currently pending.  Although we will prophylactically treat with Diflucan.  Did discuss obtaining a urinalysis to ensure there is no signs of urinary tract infection patient declined at this time.  Discussed that if vaginal pathogen swab indicates change in treatment is needed we will discuss her MyChart.  Shared decision-making was utilized with patient for treatment plan.  Differential Diagnosis, natural history, and supportive care discussed.          1. Vaginal irritation    - VAGINAL PATHOGENS DNA PANEL; Future  - fluconazole (DIFLUCAN) 150 MG tablet; Take one tablet orally for yeast  infection, if symptoms persist, may repeat treatment after 72 hours.  Dispense: 2 Tablet; Refill: 0       Medication discussed included indication for use and the potential benefits and side effects. Education was provided regarding the aforementioned assessments.  All of the patient's questions were answered to their satisfaction at the time of discharge. Patient was encouraged to monitor symptoms closely. Those signs and symptoms which would warrant concern and mandate seeking a higher level of service through the emergency department discussed at length.  Patient stated agreement and understanding of this plan of care.    Disposition:  Home in stable condition       Voice Recognition Disclaimer:  Portions of this document were created using voice recognition software. The software does have a chance of producing errors of grammar and possibly content. I have made every reasonable attempt to correct obvious errors, but there may be errors of grammar and possibly content that I did not discover before finalizing the documentation.    Karla Cifuentes, [email or web address].

## 2024-03-04 DIAGNOSIS — E03.9 HYPOTHYROIDISM, UNSPECIFIED TYPE: ICD-10-CM

## 2024-03-04 NOTE — TELEPHONE ENCOUNTER
Received request via: Pharmacy    Was the patient seen in the last year in this department? Yes    Does the patient have an active prescription (recently filled or refills available) for medication(s) requested? No    Pharmacy Name: [facility]    Does the patient have jail Plus and need 100 day supply (blood pressure, diabetes and cholesterol meds only)? Patient does not have SCP

## 2024-03-05 RX ORDER — LEVOTHYROXINE SODIUM 88 UG/1
88 TABLET ORAL
Qty: 90 TABLET | Refills: 2 | Status: SHIPPED | OUTPATIENT
Start: 2024-03-05 | End: 2024-11-30

## 2024-06-19 ENCOUNTER — HOSPITAL ENCOUNTER (EMERGENCY)
Facility: MEDICAL CENTER | Age: 39
End: 2024-06-19
Attending: OBSTETRICS & GYNECOLOGY | Admitting: OBSTETRICS & GYNECOLOGY
Payer: COMMERCIAL

## 2024-06-19 VITALS
WEIGHT: 167 LBS | OXYGEN SATURATION: 99 % | TEMPERATURE: 96.7 F | RESPIRATION RATE: 16 BRPM | DIASTOLIC BLOOD PRESSURE: 59 MMHG | HEART RATE: 80 BPM | BODY MASS INDEX: 26.84 KG/M2 | HEIGHT: 66 IN | SYSTOLIC BLOOD PRESSURE: 100 MMHG

## 2024-06-19 DIAGNOSIS — O23.43 URINARY TRACT INFECTION AFFECTING CARE OF MOTHER IN THIRD TRIMESTER, ANTEPARTUM: ICD-10-CM

## 2024-06-19 LAB
APPEARANCE UR: ABNORMAL
APPEARANCE UR: CLEAR
BACTERIA #/AREA URNS HPF: ABNORMAL /HPF
BILIRUB UR QL STRIP.AUTO: NEGATIVE
COLOR UR AUTO: YELLOW
COLOR UR: YELLOW
EPI CELLS #/AREA URNS HPF: ABNORMAL /HPF
FIBRONECTIN FETAL SPEC QL: NEGATIVE
GLUCOSE UR QL STRIP.AUTO: NEGATIVE MG/DL
GLUCOSE UR STRIP.AUTO-MCNC: NEGATIVE MG/DL
HYALINE CASTS #/AREA URNS LPF: ABNORMAL /LPF
KETONES UR QL STRIP.AUTO: NEGATIVE MG/DL
KETONES UR STRIP.AUTO-MCNC: NEGATIVE MG/DL
LEUKOCYTE ESTERASE UR QL STRIP.AUTO: ABNORMAL
LEUKOCYTE ESTERASE UR QL STRIP.AUTO: ABNORMAL
MICRO URNS: ABNORMAL
NITRITE UR QL STRIP.AUTO: NEGATIVE
NITRITE UR QL STRIP.AUTO: NEGATIVE
PH UR STRIP.AUTO: 7 [PH] (ref 5–8)
PH UR STRIP.AUTO: 7 [PH] (ref 5–8)
PROT UR QL STRIP: NEGATIVE MG/DL
PROT UR QL STRIP: NEGATIVE MG/DL
RBC # URNS HPF: ABNORMAL /HPF
RBC UR QL AUTO: NEGATIVE
RBC UR QL AUTO: NEGATIVE
SP GR UR STRIP.AUTO: 1.01
SP GR UR STRIP.AUTO: 1.02 (ref 1–1.03)
UROBILINOGEN UR STRIP.AUTO-MCNC: 0.2 MG/DL
WBC #/AREA URNS HPF: ABNORMAL /HPF

## 2024-06-19 PROCEDURE — 99284 EMERGENCY DEPT VISIT MOD MDM: CPT

## 2024-06-19 PROCEDURE — 82731 ASSAY OF FETAL FIBRONECTIN: CPT

## 2024-06-19 PROCEDURE — 59025 FETAL NON-STRESS TEST: CPT

## 2024-06-19 PROCEDURE — 81002 URINALYSIS NONAUTO W/O SCOPE: CPT | Mod: XU

## 2024-06-19 PROCEDURE — 81001 URINALYSIS AUTO W/SCOPE: CPT

## 2024-06-19 RX ORDER — NITROFURANTOIN 25; 75 MG/1; MG/1
100 CAPSULE ORAL 2 TIMES DAILY
Qty: 14 CAPSULE | Refills: 0 | Status: ACTIVE | OUTPATIENT
Start: 2024-06-19 | End: 2024-06-26

## 2024-06-19 RX ORDER — NITROFURANTOIN 25; 75 MG/1; MG/1
100 CAPSULE ORAL 2 TIMES DAILY WITH MEALS
Status: CANCELLED | OUTPATIENT
Start: 2024-06-19 | End: 2024-06-26

## 2024-06-19 ASSESSMENT — PAIN SCALES - GENERAL: PAINLEVEL: 0 - NO PAIN

## 2024-06-19 ASSESSMENT — FIBROSIS 4 INDEX: FIB4 SCORE: 1.04

## 2024-06-19 NOTE — PROGRESS NOTES
Pt arrived with c/o decreased fetal movement. States she last felt movement upon arrival to the unit but it is not enough to meet kick counts. Pt denies cramping/contractions, LOF or vaginal bleeding.     1750- Discharge instructions reviewed with pt including when to return. Pt able to verbalize understanding. Pt ambulates off unit independently.

## 2024-06-20 NOTE — ED PROVIDER NOTES
DATE OF SERVICE:  2024     HISTORY OF PRESENT ILLNESS:  The patient is a 38-year-old female, , who   presents at 28 and 6/7th weeks' gestation to labor and delivery, complaining   of decreased fetal movement.  She denies any leaking of fluid or vaginal   bleeding.  She denies feeling any contractions.  The patient has advanced   maternal age.  She had NIPT testing, low risk.  She is a home health nurse.    She has hypothyroidism, on Synthroid.  She has a history of polycystic ovarian   syndrome and endometriosis.  She has a previous  x2 and will be for   repeat at 39 weeks.  She has had a fairly uneventful prenatal course thus far.     PAST MEDICAL HISTORY:  Again, significant for hypothyroid, polycystic ovarian   syndrome and endometriosis.     SOCIAL HISTORY:  She is a home health nurse, .  Her 's name is   Ronaldo.  No alcohol, tobacco or history of drug use.     FAMILY HISTORY:  Includes stroke and osteoporosis.     PAST SURGICAL HISTORY:  Includes primary , repeat  and   laparoscopy for endometriosis.     OBSTETRIC HISTORY:  .  Again, 2 prior C-sections in  and .     GYNECOLOGIC HISTORY:  Last menstrual period 2023.  No history of   abnormal Paps or STDs.     CURRENT MEDICATIONS:  Include prenatal vitamins.  She is on Synthroid and low   dose aspirin.     ALLERGIES:  No known drug allergies.     PHYSICAL EXAMINATION:  VITAL SIGNS:  Stable.  She is afebrile.  GENERAL:  Awake, alert, oriented.  She is in no acute distress.  CHEST:  Respiratory rate is normal.  ABDOMEN:  Gravid, nontender, nondistended, normal bowel sounds.  EXTREMITIES:  No cyanosis, clubbing or edema.  PELVIC:  Sterile vaginal exam for a fetal fibronectin was done and cervix   appeared closed per nurse and fetal fibronectin returned negative.  Fetal   heart rate tracing actually category 1, reactive, moderate variability, no   decelerations.  She was having some uterine  irritability with rare   contractions.     LABORATORY DATA:  Urinalysis was done, which did show evidence of probable   urinary tract infection with trace leukocyte esterase, 5-10 white blood cells   and moderate bacteria.     ASSESSMENT AND PLAN:  A 38-year-old female,  at 28 and 6/7th weeks'   gestation.  1.  Arrived for decreased fetal movement.  The baby is moving actively now,   category 1, reactive tracing.  She will resume kick counts and  labor   warnings.  2.  Probable urinary tract infection.  We will plan to put her on Macrobid 100   mg p.o. b.i.d. for 7 days.  Culture will be done and the patient is to call   and enquire if culture is back by Friday afternoon or Monday at the latest.    She will follow up with me in the office as scheduled and again resume kick   counts and  labor warnings.  3.  Previous  section x2, for repeat at 39 weeks.  4.  Advanced maternal age.  She will be discharged home.        ______________________________  MD MEREDITH MORINP/BIN    DD:  2024 17:44  DT:  2024 18:05    Job#:  [id number]

## 2024-11-02 ENCOUNTER — OFFICE VISIT (OUTPATIENT)
Dept: URGENT CARE | Facility: CLINIC | Age: 39
End: 2024-11-02
Payer: COMMERCIAL

## 2024-11-02 VITALS
SYSTOLIC BLOOD PRESSURE: 108 MMHG | WEIGHT: 170 LBS | HEART RATE: 86 BPM | RESPIRATION RATE: 14 BRPM | TEMPERATURE: 97.2 F | DIASTOLIC BLOOD PRESSURE: 78 MMHG | OXYGEN SATURATION: 97 % | HEIGHT: 66 IN | BODY MASS INDEX: 27.32 KG/M2

## 2024-11-02 DIAGNOSIS — R11.2 NAUSEA AND VOMITING, UNSPECIFIED VOMITING TYPE: ICD-10-CM

## 2024-11-02 PROCEDURE — 99213 OFFICE O/P EST LOW 20 MIN: CPT

## 2024-11-02 PROCEDURE — 3074F SYST BP LT 130 MM HG: CPT

## 2024-11-02 PROCEDURE — 3078F DIAST BP <80 MM HG: CPT

## 2024-11-02 RX ORDER — ONDANSETRON 4 MG/1
8 TABLET, ORALLY DISINTEGRATING ORAL ONCE
Status: COMPLETED | OUTPATIENT
Start: 2024-11-02 | End: 2024-11-02

## 2024-11-02 RX ORDER — ONDANSETRON 8 MG/1
8 TABLET, ORALLY DISINTEGRATING ORAL EVERY 6 HOURS PRN
Qty: 20 TABLET | Refills: 0 | Status: SHIPPED | OUTPATIENT
Start: 2024-11-02

## 2024-11-02 RX ADMIN — ONDANSETRON 8 MG: 4 TABLET, ORALLY DISINTEGRATING ORAL at 11:47

## 2024-11-02 ASSESSMENT — ENCOUNTER SYMPTOMS
COUGH: 0
VOMITING: 1
MYALGIAS: 0
SPUTUM PRODUCTION: 0
SINUS PAIN: 0
DIZZINESS: 0
NAUSEA: 1
DIARRHEA: 0
PSYCHIATRIC NEGATIVE: 1
WHEEZING: 0
DIAPHORESIS: 0
ABDOMINAL PAIN: 0
CHILLS: 0
FEVER: 0
BLOOD IN STOOL: 0
SORE THROAT: 0
SHORTNESS OF BREATH: 0
EYE DISCHARGE: 0
HEADACHES: 0
BLURRED VISION: 0
WEAKNESS: 0

## 2024-11-02 ASSESSMENT — FIBROSIS 4 INDEX: FIB4 SCORE: 1.07

## 2024-11-02 NOTE — PATIENT INSTRUCTIONS
EDUCATION  80% of diarrhea and vomiting is viral   Food poisoning can occur within the first 6 hours of ingestion up to 16 hours after ingestion  DIY oral rehydration 1L water + 6t sugar + ½ t salt   Bismuth subsalicylate (pepto) are part of the aspirin family, do not combine pepto and aspirin; It can also make your stools and tongue black.

## 2024-11-02 NOTE — PROGRESS NOTES
Subjective:   Mihai Brandt is a 39 y.o. female who presents for Emesis (Started yesterday, constant vomiting patient explains, can not hold any food down she explains)      HPI  Patient states she was very nauseous yesterday and then started vomiting in the afternoon. Since then she has been vomiting every 1-2 hours, unable to tolerate food, she is drinking water but unable to keep the water down.     No chance for pregnancy as no sexual intercourse since having baby two months ago.     Negative: blood in vomit, blood in stool, fever, sick contacts, recent travel, recent antibiotic use, recent hospitalization, recent camping, tachycardia, hypotension, dry mucous membranes, CVA tenderness, urinary complaints, recent trauma, recent abd surgery, changes to medications, pulsatile mass in abd, hx of IBS, weight loss, night sweats, inability to tolerate PO fluids, crowded living condition        Review of Systems   Constitutional:  Negative for chills, diaphoresis, fever and malaise/fatigue.   HENT:  Negative for congestion, ear pain, sinus pain and sore throat.    Eyes:  Negative for blurred vision and discharge.   Respiratory:  Negative for cough, sputum production, shortness of breath and wheezing.    Cardiovascular:  Negative for chest pain.   Gastrointestinal:  Positive for nausea and vomiting. Negative for abdominal pain, blood in stool and diarrhea.   Genitourinary: Negative.    Musculoskeletal:  Negative for myalgias.   Skin:  Negative for rash.   Neurological:  Negative for dizziness, weakness and headaches.   Endo/Heme/Allergies: Negative.    Psychiatric/Behavioral: Negative.     All other systems reviewed and are negative.      No Known Allergies    Patient Active Problem List    Diagnosis Date Noted    Urinary tract infection affecting care of mother in third trimester, antepartum 06/19/2024    Acne vulgaris 10/22/2019    History of PCOS 01/04/2019    Hypothyroidism 01/04/2019       Current Outpatient  "Medications Ordered in Epic   Medication Sig Dispense Refill    ondansetron (ZOFRAN ODT) 8 MG TABLET DISPERSIBLE Take 1 Tablet by mouth every 6 hours as needed for Nausea/Vomiting for up to 20 doses. 20 Tablet 0    levothyroxine (SYNTHROID) 88 MCG Tab Take 1 Tablet by mouth every morning on an empty stomach for 270 days. 90 Tablet 2     No current Epic-ordered facility-administered medications on file.       Past Surgical History:   Procedure Laterality Date    REPEAT C SECTION  12/19/2016    Procedure: REPEAT C SECTION;  Surgeon: Alma Centeno M.D.;  Location: LABOR AND DELIVERY;  Service:     PRIMARY C SECTION      07/2015       Social History     Tobacco Use    Smoking status: Never    Smokeless tobacco: Never   Vaping Use    Vaping status: Never Used   Substance Use Topics    Alcohol use: No    Drug use: No       family history includes Heart Disease in her father and paternal grandfather; Hypertension in her father.     Problem list, medications, and allergies reviewed by myself today in Epic.     Objective:   /78 (BP Location: Left arm, Patient Position: Sitting, BP Cuff Size: Large adult)   Pulse 86   Temp 36.2 °C (97.2 °F)   Resp 14   Ht 1.676 m (5' 6\")   Wt 77.1 kg (170 lb)   LMP 09/08/2023 (Exact Date)   SpO2 97%   BMI 27.44 kg/m²     Physical Exam  Vitals reviewed.   Constitutional:       General: She is not in acute distress.     Appearance: Normal appearance. She is not ill-appearing, toxic-appearing or diaphoretic.   HENT:      Head: Normocephalic.      Right Ear: External ear normal.      Left Ear: External ear normal.      Nose: Nose normal.      Mouth/Throat:      Mouth: Mucous membranes are moist.      Pharynx: Oropharynx is clear.   Eyes:      Conjunctiva/sclera: Conjunctivae normal.      Pupils: Pupils are equal, round, and reactive to light.   Cardiovascular:      Rate and Rhythm: Normal rate and regular rhythm.      Pulses: Normal pulses.      Heart sounds: Normal heart sounds. "   Pulmonary:      Effort: Pulmonary effort is normal.      Breath sounds: Normal breath sounds.   Abdominal:      General: Abdomen is flat. Bowel sounds are increased.      Palpations: Abdomen is soft.      Tenderness: There is generalized abdominal tenderness.   Musculoskeletal:         General: Normal range of motion.      Cervical back: Normal range of motion.   Skin:     General: Skin is warm.      Capillary Refill: Capillary refill takes less than 2 seconds.   Neurological:      General: No focal deficit present.      Mental Status: She is alert and oriented to person, place, and time.   Psychiatric:         Mood and Affect: Mood normal.         Behavior: Behavior normal.         Assessment/Plan:     Diagnosis and associated orders:     1. Nausea and vomiting, unspecified vomiting type  - ondansetron (ZOFRAN ODT) 8 MG TABLET DISPERSIBLE; Take 1 Tablet by mouth every 6 hours as needed for Nausea/Vomiting for up to 20 doses.  Dispense: 20 Tablet; Refill: 0  - ondansetron (Zofran ODT) dispertab 8 mg     Comments/MDM:   I personally reviewed prior external notes and test results pertinent to today's visit as well as additional imaging and testing completed in clinic today.    1. Nausea and vomiting, unspecified vomiting type  ondansetron (ZOFRAN ODT) 8 MG TABLET DISPERSIBLE    ondansetron (Zofran ODT) dispertab 8 mg          Pleasant 39-year-old afebrile, hemodynamically stable female presenting with complaints of nausea and vomiting since yesterday.  Patient states she woke up nauseous yesterday morning and by the afternoon she had began of vomiting.  No concern for pregnancy as patient has not been sexually active since having her baby 2 months ago.  No recent travel, hospitalization, antibiotic use.  No red flag warnings noted.  Normal physical exam outside of generalized abdominal tenderness with increased bowel sounds noted.  No concern for severe dehydration or acute abdomen At this time.  Patient agreeable  to in clinic Zofran and p.o. challenge.  After 8 mg of Zofran in clinic patient was able to tolerate water.  A prescription for Zofran has been sent to the pharmacy.  Patient most likely suffering from acute viral syndrome with vomiting.  Patient given very strict instructions that if symptoms fail to improve after 48 hours or they worsen to follow-up with the emergency room.  Patient verbalized understanding.       Differential diagnosis considered including acute abdomen, food poisoning, c diff, pregnancy.  These were thought to be less likely given the history, exam, and workup. No red flag warnings noted.  Patient given strict instructions to follow up with emergency room if they develop any red flag warnings which were discussed in depth.  They verbalized understanding. Medical decision making completed using the shared decision making model treatment plan decided together with patient.      Please note that this dictation was created using voice recognition software. I have made every reasonable attempt to correct obvious errors, but I expect that there are errors of grammar and possibly content that I did not discover before finalizing the note.

## 2024-12-02 ENCOUNTER — OFFICE VISIT (OUTPATIENT)
Dept: MEDICAL GROUP | Facility: LAB | Age: 39
End: 2024-12-02
Payer: COMMERCIAL

## 2024-12-02 VITALS
TEMPERATURE: 97.5 F | BODY MASS INDEX: 26.2 KG/M2 | RESPIRATION RATE: 16 BRPM | HEART RATE: 74 BPM | SYSTOLIC BLOOD PRESSURE: 112 MMHG | HEIGHT: 66 IN | OXYGEN SATURATION: 99 % | DIASTOLIC BLOOD PRESSURE: 72 MMHG | WEIGHT: 163 LBS

## 2024-12-02 DIAGNOSIS — N39.0 CHRONIC UTI: ICD-10-CM

## 2024-12-02 DIAGNOSIS — E03.9 HYPOTHYROIDISM, UNSPECIFIED TYPE: ICD-10-CM

## 2024-12-02 DIAGNOSIS — N30.90 CYSTITIS: ICD-10-CM

## 2024-12-02 DIAGNOSIS — Z23 NEED FOR VACCINATION: ICD-10-CM

## 2024-12-02 PROCEDURE — 99214 OFFICE O/P EST MOD 30 MIN: CPT | Mod: 25 | Performed by: FAMILY MEDICINE

## 2024-12-02 PROCEDURE — 90471 IMMUNIZATION ADMIN: CPT | Performed by: FAMILY MEDICINE

## 2024-12-02 PROCEDURE — 3074F SYST BP LT 130 MM HG: CPT | Performed by: FAMILY MEDICINE

## 2024-12-02 PROCEDURE — 3078F DIAST BP <80 MM HG: CPT | Performed by: FAMILY MEDICINE

## 2024-12-02 PROCEDURE — 90656 IIV3 VACC NO PRSV 0.5 ML IM: CPT | Performed by: FAMILY MEDICINE

## 2024-12-02 RX ORDER — ONDANSETRON 4 MG/1
TABLET, ORALLY DISINTEGRATING ORAL
COMMUNITY
End: 2024-12-02

## 2024-12-02 RX ORDER — SULFAMETHOXAZOLE AND TRIMETHOPRIM 800; 160 MG/1; MG/1
1 TABLET ORAL 2 TIMES DAILY
Qty: 14 TABLET | Refills: 0 | Status: SHIPPED | OUTPATIENT
Start: 2024-12-02 | End: 2024-12-02

## 2024-12-02 RX ORDER — OMEGA-3 FATTY ACIDS/FISH OIL 300-1000MG
CAPSULE ORAL
COMMUNITY

## 2024-12-02 RX ORDER — CEPHALEXIN 500 MG/1
CAPSULE ORAL
COMMUNITY
Start: 2024-10-02 | End: 2024-12-02

## 2024-12-02 RX ORDER — ASPIRIN 81 MG/1
TABLET, CHEWABLE ORAL
COMMUNITY
End: 2024-12-02

## 2024-12-02 RX ORDER — NITROFURANTOIN 25; 75 MG/1; MG/1
1 CAPSULE ORAL 2 TIMES DAILY
COMMUNITY
End: 2024-12-02

## 2024-12-02 RX ORDER — LEVOTHYROXINE SODIUM 88 UG/1
88 TABLET ORAL
Qty: 90 TABLET | Refills: 3 | Status: SHIPPED | OUTPATIENT
Start: 2025-01-01

## 2024-12-02 RX ORDER — TRIAMCINOLONE ACETONIDE 0.25 MG/G
CREAM TOPICAL
COMMUNITY
End: 2024-12-02

## 2024-12-02 RX ORDER — SULFAMETHOXAZOLE AND TRIMETHOPRIM 800; 160 MG/1; MG/1
TABLET ORAL
COMMUNITY
Start: 2024-10-08 | End: 2024-12-02

## 2024-12-02 RX ORDER — SULFAMETHOXAZOLE AND TRIMETHOPRIM 800; 160 MG/1; MG/1
1 TABLET ORAL 2 TIMES DAILY
Qty: 14 TABLET | Refills: 2 | Status: SHIPPED | OUTPATIENT
Start: 2024-12-02 | End: 2024-12-09

## 2024-12-02 RX ORDER — LEVOTHYROXINE SODIUM 88 UG/1
1 TABLET ORAL
COMMUNITY
End: 2024-12-02 | Stop reason: SDUPTHER

## 2024-12-02 ASSESSMENT — PATIENT HEALTH QUESTIONNAIRE - PHQ9: CLINICAL INTERPRETATION OF PHQ2 SCORE: 0

## 2024-12-02 ASSESSMENT — FIBROSIS 4 INDEX: FIB4 SCORE: 1.07

## 2024-12-02 NOTE — PROGRESS NOTES
Verbal consent was acquired by the patient to use [employer] ambient listening note generation during this visit Yes    Subjective:   Mihai Brandt is a 39 y.o. female here today for   Chief Complaint   Patient presents with    Medication Follow-up    Dysuria     History of Present Illness  The patient is a 39-year-old female here today for a medication refill.    She has a history of hypothyroidism, currently managed with levothyroxine 88 mcg daily. Her thyroid condition was managed by her OB/GYN during her pregnancy, who provided her with prescriptions. She recently had blood work done at [facility], which showed normal results. She continues to take her medication daily without any changes. She reports feeling well and sleeping adequately. She has started experiencing postpartum hair loss. Throughout her pregnancy and postpartum period, all her lab results were normal. Her medication dosage remained unchanged during her pregnancy. Her last Pap smear was conducted a few days before she became pregnant.    She also presents with new symptoms of dysuria. She has a history of recurrent urinary tract infections (UTIs). Two years ago, she was referred to a urologist who prescribed Bactrim, which successfully treated her condition. However, after becoming pregnant, she experienced a recurrence of the same symptoms. She reports increased frequency of urination, particularly in the morning. She has been experiencing these symptoms for two days and has found some relief with over-the-counter Azo. She reports no fevers, chills, abdominal pain, or back pain. She requests a referral to a urologist.      No Known Allergies      Current medicines (including changes today)  Current Outpatient Medications   Medication Sig Dispense Refill    levothyroxine (SYNTHROID) 88 MCG Tab Take 1 Tablet by mouth every morning on an empty stomach.      Cholecalciferol (VITAMIN D-3) 125 MCG (5000 UT) Tab       Omega 3 1000 MG Cap Take by  "oral route.      nitrofurantoin (MACROBID) 100 MG Cap Take 1 Capsule by mouth 2 times a day.      ondansetron (ZOFRAN ODT) 8 MG TABLET DISPERSIBLE Take 1 Tablet by mouth every 6 hours as needed for Nausea/Vomiting for up to 20 doses. 20 Tablet 0     No current facility-administered medications for this visit.     She  has a past medical history of Anemia, Anesthesia, Indigestion, Silas product of in vitro fertilization (IVF) pregnancy, Pain, PCOD (polycystic ovarian disease), Pregnant, and Thyroid disease.    ROS   ROS  -See HPI     Objective:     Physical Exam:  Pulse 74   Temp 36.4 °C (97.5 °F) (Temporal)   Resp 16   Ht 1.676 m (5' 5.98\")   Wt 73.9 kg (163 lb)   SpO2 99%  Body mass index is 26.32 kg/m².   Constitutional: Alert, no distress, well-groomed.  Skin: No rashes in visible areas.  Eye: Round. Conjunctiva clear, lids normal. No icterus.   ENMT: Lips pink without lesions, good dentition, moist mucous membranes. Phonation normal.  Neck: No masses, no thyromegaly. Moves freely without pain.  Respiratory: Unlabored respiratory effort, no cough or audible wheeze  Psych: Alert and oriented x3, normal affect and mood.     Results      Assessment and Plan:     Assessment & Plan  1. Urinary Tract Infection.  She reports a history of frequent UTIs and is currently experiencing dysuria, morning frequency, and tenderness upon pressing the abdomen. Symptoms have been present for two days and she has taken over-the-counter Azo, which provided some relief. There are no fevers, chills, abdominal pain, or back pain. A prescription for Bactrim for a 7-day course will be called into [facility]. A referral to urology has been made. The use of d-mannose was suggested to help decrease UTIs. If symptoms worsen, she is advised to contact us immediately.    2. Hypothyroidism.  She is currently on levothyroxine 88 mcg daily and reports no changes in her condition. Recent blood work from October showed normal results. A " prescription for Synthroid will be called in to [facility] to be filled in a month, covering her for the next year. Lab records from [facility] will be requested. She reports postpartum hair loss but no increased fatigue or changes to hair or nails. Labs will be checked every 6 to 12 months now that she is not pregnant.    3. Health Maintenance.  She is scheduled to receive her influenza vaccine today. Her last Pap smear was in November or December of last year.      Orders:  1. Cystitis  - sulfamethoxazole-trimethoprim (BACTRIM DS) 800-160 MG tablet; Take 1 Tablet by mouth 2 times a day for 7 days.  Dispense: 14 Tablet; Refill: 2    2. Chronic UTI  - Referral to Urology    3. Hypothyroidism, unspecified type  - levothyroxine (SYNTHROID) 88 MCG Tab; Take 1 Tablet by mouth every morning on an empty stomach.  Dispense: 90 Tablet; Refill: 3    4. Need for vaccination  - INFLUENZA VACCINE TRI INJ (PF)    Other orders  - Cholecalciferol (VITAMIN D-3) 125 MCG (5000 UT) Tab  - Omega 3 1000 MG Cap; Take by oral route.        Followup: No follow-ups on file.         PLEASE NOTE: This dictation was created using voice recognition and [employer] ambient listening software. I have made every reasonable attempt to correct obvious errors, but I expect that there are errors of grammar and possibly content that I did not discover before finalizing the note.

## 2024-12-06 ENCOUNTER — PATIENT MESSAGE (OUTPATIENT)
Dept: MEDICAL GROUP | Facility: LAB | Age: 39
End: 2024-12-06
Payer: COMMERCIAL

## 2024-12-06 RX ORDER — FLUCONAZOLE 150 MG/1
TABLET ORAL
Qty: 2 TABLET | Refills: 1 | Status: SHIPPED | OUTPATIENT
Start: 2024-12-06

## 2024-12-06 NOTE — TELEPHONE ENCOUNTER
I called in fluconazole for patient to begin to treat for potential yeast infection.  If after taking medication patient continues have symptoms please have her follow-up with me for further evaluation.

## 2024-12-12 ENCOUNTER — HOSPITAL ENCOUNTER (OUTPATIENT)
Facility: MEDICAL CENTER | Age: 39
End: 2024-12-12
Attending: PHYSICIAN ASSISTANT
Payer: COMMERCIAL

## 2024-12-12 ENCOUNTER — OFFICE VISIT (OUTPATIENT)
Dept: URGENT CARE | Facility: CLINIC | Age: 39
End: 2024-12-12
Payer: COMMERCIAL

## 2024-12-12 VITALS
TEMPERATURE: 97.5 F | SYSTOLIC BLOOD PRESSURE: 118 MMHG | HEART RATE: 76 BPM | RESPIRATION RATE: 14 BRPM | OXYGEN SATURATION: 100 % | BODY MASS INDEX: 26.03 KG/M2 | WEIGHT: 162 LBS | HEIGHT: 66 IN | DIASTOLIC BLOOD PRESSURE: 72 MMHG

## 2024-12-12 DIAGNOSIS — N30.00 ACUTE CYSTITIS WITHOUT HEMATURIA: ICD-10-CM

## 2024-12-12 DIAGNOSIS — R30.0 DYSURIA: ICD-10-CM

## 2024-12-12 DIAGNOSIS — L30.9 ECZEMA, UNSPECIFIED TYPE: ICD-10-CM

## 2024-12-12 LAB
APPEARANCE UR: NORMAL
BILIRUB UR STRIP-MCNC: NEGATIVE MG/DL
COLOR UR AUTO: YELLOW
GLUCOSE UR STRIP.AUTO-MCNC: NEGATIVE MG/DL
KETONES UR STRIP.AUTO-MCNC: NEGATIVE MG/DL
LEUKOCYTE ESTERASE UR QL STRIP.AUTO: NORMAL
NITRITE UR QL STRIP.AUTO: POSITIVE
PH UR STRIP.AUTO: 5.5 [PH] (ref 5–8)
POCT INT CON NEG: NEGATIVE
POCT INT CON POS: POSITIVE
POCT URINE PREGNANCY TEST: NEGATIVE
PROT UR QL STRIP: 30 MG/DL
RBC UR QL AUTO: NORMAL
SP GR UR STRIP.AUTO: 1.03
UROBILINOGEN UR STRIP-MCNC: 0.2 MG/DL

## 2024-12-12 PROCEDURE — 87186 SC STD MICRODIL/AGAR DIL: CPT

## 2024-12-12 PROCEDURE — 99214 OFFICE O/P EST MOD 30 MIN: CPT | Mod: 25 | Performed by: PHYSICIAN ASSISTANT

## 2024-12-12 PROCEDURE — 87086 URINE CULTURE/COLONY COUNT: CPT

## 2024-12-12 PROCEDURE — 81002 URINALYSIS NONAUTO W/O SCOPE: CPT | Performed by: PHYSICIAN ASSISTANT

## 2024-12-12 PROCEDURE — 3074F SYST BP LT 130 MM HG: CPT | Performed by: PHYSICIAN ASSISTANT

## 2024-12-12 PROCEDURE — 3078F DIAST BP <80 MM HG: CPT | Performed by: PHYSICIAN ASSISTANT

## 2024-12-12 PROCEDURE — 81025 URINE PREGNANCY TEST: CPT | Performed by: PHYSICIAN ASSISTANT

## 2024-12-12 PROCEDURE — 87077 CULTURE AEROBIC IDENTIFY: CPT

## 2024-12-12 RX ORDER — TRIAMCINOLONE ACETONIDE 1 MG/G
CREAM TOPICAL
Qty: 45 G | Refills: 0 | Status: SHIPPED | OUTPATIENT
Start: 2024-12-12

## 2024-12-12 RX ORDER — NITROFURANTOIN 25; 75 MG/1; MG/1
100 CAPSULE ORAL 2 TIMES DAILY
Qty: 20 CAPSULE | Refills: 0 | Status: SHIPPED | OUTPATIENT
Start: 2024-12-12 | End: 2024-12-22

## 2024-12-12 ASSESSMENT — ENCOUNTER SYMPTOMS
FEVER: 0
FLANK PAIN: 0
CHILLS: 0

## 2024-12-12 ASSESSMENT — FIBROSIS 4 INDEX: FIB4 SCORE: 1.07

## 2024-12-12 NOTE — PROGRESS NOTES
Subjective:   Mihai Brandt is a 39 y.o. female who presents today with   Chief Complaint   Patient presents with    UTI     X 10 days, on and off  recurrent UTI symptoms.    Rash     X 3 day, red rash all over body, not itchy.     UTI  This is a new problem. Episode onset: 10 days. The problem occurs constantly. The problem has been unchanged. Associated symptoms include a rash. Pertinent negatives include no chills or fever. She has tried nothing for the symptoms. The treatment provided no relief.     Patient has been dealing with recurrent UTI symptoms and saw her primary care and had referral to urology.  Patient states she has had intermittent symptoms over the last 10 days and they improved with drinking water but then comes back if she does not drink as much water on other days.  She has also noticed a rash over the last 3 days.  Patient notes no new vaginal discharge and no new sexual partners or concerns for STDs.      PMH:  has a past medical history of Anemia, Anesthesia, Indigestion,  product of in vitro fertilization (IVF) pregnancy, Pain, PCOD (polycystic ovarian disease), Pregnant, and Thyroid disease.  MEDS:   Current Outpatient Medications:     nitrofurantoin (MACROBID) 100 MG Cap, Take 1 Capsule by mouth 2 times a day for 10 days., Disp: 20 Capsule, Rfl: 0    triamcinolone acetonide (KENALOG) 0.1 % Cream, Apply thin layer to affected area twice daily for up to 2 weeks, Disp: 45 g, Rfl: 0    [START ON 2025] levothyroxine (SYNTHROID) 88 MCG Tab, Take 1 Tablet by mouth every morning on an empty stomach., Disp: 90 Tablet, Rfl: 3    fluconazole (DIFLUCAN) 150 MG tablet, Take 1 tab po now. Take 2nd tab po in 72 hours. (Patient not taking: Reported on 2024), Disp: 2 Tablet, Rfl: 1    Cholecalciferol (VITAMIN D-3) 125 MCG (5000 UT) Tab, , Disp: , Rfl:     Omega 3 1000 MG Cap, Take by oral route. (Patient not taking: Reported on 2024), Disp: , Rfl:   ALLERGIES: No Known  "Allergies  SURGHX:   Past Surgical History:   Procedure Laterality Date    REPEAT C SECTION  12/19/2016    Procedure: REPEAT C SECTION;  Surgeon: Alma Centeno M.D.;  Location: LABOR AND DELIVERY;  Service:     PRIMARY C SECTION      07/2015     SOCHX:  reports that she has never smoked. She has never used smokeless tobacco. She reports that she does not drink alcohol and does not use drugs.  FH: Reviewed with patient, not pertinent to this visit.     Review of Systems   Constitutional:  Negative for chills and fever.   Genitourinary:  Positive for dysuria, frequency and urgency. Negative for flank pain and hematuria.   Skin:  Positive for rash.      Objective:   /72   Pulse 76   Temp 36.4 °C (97.5 °F) (Temporal)   Resp 14   Ht 1.676 m (5' 6\")   Wt 73.5 kg (162 lb)   LMP 11/20/2024   SpO2 100%   BMI 26.15 kg/m²   Physical Exam  Vitals and nursing note reviewed.   Constitutional:       General: She is not in acute distress.     Appearance: Normal appearance. She is well-developed. She is not ill-appearing or toxic-appearing.   HENT:      Head: Normocephalic and atraumatic.      Right Ear: Hearing normal.      Left Ear: Hearing normal.   Pulmonary:      Effort: Pulmonary effort is normal.   Abdominal:      Tenderness: There is abdominal tenderness in the suprapubic area. There is no right CVA tenderness or left CVA tenderness.   Genitourinary:     Comments: Patient deferred  exam  Musculoskeletal:      Comments: Normal movement in all 4 extremities   Skin:     General: Skin is warm and dry.      Comments: Dry eczematous rash noted to the upper and lower extremities.  No vesicles or pustules.  No surrounding erythema.  No drainage or discharge.  No open wounds.     Neurological:      Mental Status: She is alert.      Coordination: Coordination normal.   Psychiatric:         Mood and Affect: Mood normal.       UA consistent with infection   Pregnancy negative      Assessment/Plan:   Assessment    1. " Dysuria  - POCT Urinalysis  - POCT Pregnancy    2. Acute cystitis without hematuria  - URINE CULTURE(NEW); Future  - nitrofurantoin (MACROBID) 100 MG Cap; Take 1 Capsule by mouth 2 times a day for 10 days.  Dispense: 20 Capsule; Refill: 0    3. Eczema, unspecified type  - triamcinolone acetonide (KENALOG) 0.1 % Cream; Apply thin layer to affected area twice daily for up to 2 weeks  Dispense: 45 g; Refill: 0    Symptoms and presentation appear consistent with urinary tract infection we will treat accordingly with antibiotics.  Patient only to use 5 days of the antibiotic and she is understanding of this.  She will have another 5 days on hand in case symptoms recur before she goes to see the urologist.  We will follow-up with urine culture and adjust treatment currently if needed at that time.    Patient also with rash that appears consistent with eczema and recommend treating with triamcinolone to the area for up to 2 weeks or until resolution of symptoms sooner.    Differential diagnosis, natural history, supportive care, and indications for immediate follow-up discussed.   Patient given instructions and understanding of medications and treatment.    If not improving in 3-5 days, F/U with PCP or return to UC if symptoms worsen.    Patient agreeable to plan.    Please note that this dictation was created using voice recognition software. I have made every reasonable attempt to correct obvious errors, but I expect that there are errors of grammar and possibly content that I did not discover before finalizing the note.    Jak Dejesus PA-C

## 2024-12-15 LAB
BACTERIA UR CULT: ABNORMAL
BACTERIA UR CULT: ABNORMAL
SIGNIFICANT IND 70042: ABNORMAL
SITE SITE: ABNORMAL
SOURCE SOURCE: ABNORMAL

## 2025-01-01 ENCOUNTER — PATIENT MESSAGE (OUTPATIENT)
Dept: MEDICAL GROUP | Facility: LAB | Age: 40
End: 2025-01-01
Payer: COMMERCIAL

## 2025-01-02 ENCOUNTER — OFFICE VISIT (OUTPATIENT)
Dept: URGENT CARE | Facility: CLINIC | Age: 40
End: 2025-01-02
Payer: COMMERCIAL

## 2025-01-02 ENCOUNTER — HOSPITAL ENCOUNTER (OUTPATIENT)
Facility: MEDICAL CENTER | Age: 40
End: 2025-01-02
Attending: NURSE PRACTITIONER
Payer: COMMERCIAL

## 2025-01-02 VITALS
RESPIRATION RATE: 16 BRPM | OXYGEN SATURATION: 98 % | BODY MASS INDEX: 26.2 KG/M2 | TEMPERATURE: 98.1 F | SYSTOLIC BLOOD PRESSURE: 108 MMHG | HEART RATE: 70 BPM | WEIGHT: 163 LBS | HEIGHT: 66 IN | DIASTOLIC BLOOD PRESSURE: 62 MMHG

## 2025-01-02 DIAGNOSIS — N30.01 ACUTE CYSTITIS WITH HEMATURIA: ICD-10-CM

## 2025-01-02 DIAGNOSIS — R30.0 DYSURIA: ICD-10-CM

## 2025-01-02 DIAGNOSIS — N39.0 FREQUENT UTI: ICD-10-CM

## 2025-01-02 LAB
APPEARANCE UR: NORMAL
BILIRUB UR STRIP-MCNC: NORMAL MG/DL
COLOR UR AUTO: YELLOW
GLUCOSE UR STRIP.AUTO-MCNC: NORMAL MG/DL
KETONES UR STRIP.AUTO-MCNC: NORMAL MG/DL
LEUKOCYTE ESTERASE UR QL STRIP.AUTO: NORMAL
NITRITE UR QL STRIP.AUTO: NORMAL
PH UR STRIP.AUTO: 5.5 [PH] (ref 5–8)
POCT INT CON NEG: NEGATIVE
POCT INT CON POS: POSITIVE
POCT URINE PREGNANCY TEST: NEGATIVE
PROT UR QL STRIP: 30 MG/DL
RBC UR QL AUTO: NORMAL
SP GR UR STRIP.AUTO: 1.02
UROBILINOGEN UR STRIP-MCNC: 0.2 MG/DL

## 2025-01-02 PROCEDURE — 87086 URINE CULTURE/COLONY COUNT: CPT

## 2025-01-02 PROCEDURE — 3078F DIAST BP <80 MM HG: CPT | Performed by: NURSE PRACTITIONER

## 2025-01-02 PROCEDURE — 81002 URINALYSIS NONAUTO W/O SCOPE: CPT | Performed by: NURSE PRACTITIONER

## 2025-01-02 PROCEDURE — 3074F SYST BP LT 130 MM HG: CPT | Performed by: NURSE PRACTITIONER

## 2025-01-02 PROCEDURE — 87186 SC STD MICRODIL/AGAR DIL: CPT

## 2025-01-02 PROCEDURE — 81025 URINE PREGNANCY TEST: CPT | Performed by: NURSE PRACTITIONER

## 2025-01-02 PROCEDURE — 99214 OFFICE O/P EST MOD 30 MIN: CPT | Performed by: NURSE PRACTITIONER

## 2025-01-02 PROCEDURE — 87077 CULTURE AEROBIC IDENTIFY: CPT

## 2025-01-02 RX ORDER — PHENAZOPYRIDINE HYDROCHLORIDE 200 MG/1
200 TABLET, FILM COATED ORAL 3 TIMES DAILY PRN
Qty: 6 TABLET | Refills: 0 | Status: SHIPPED | OUTPATIENT
Start: 2025-01-02 | End: 2025-01-20

## 2025-01-02 RX ORDER — IBUPROFEN 200 MG
200 TABLET ORAL EVERY 6 HOURS PRN
COMMUNITY
End: 2025-01-02

## 2025-01-02 RX ORDER — NITROFURANTOIN 25; 75 MG/1; MG/1
100 CAPSULE ORAL EVERY 12 HOURS
Qty: 10 CAPSULE | Refills: 0 | Status: SHIPPED | OUTPATIENT
Start: 2025-01-02 | End: 2025-01-07

## 2025-01-02 ASSESSMENT — FIBROSIS 4 INDEX: FIB4 SCORE: 1.07

## 2025-01-02 NOTE — PROGRESS NOTES
Mihai Brandt is a 39 y.o. female who presents for Painful Urination (Was here on 24, will see Urologist on 1/10/25, has had multiple UTIs since ; x1; painful urination, frequency, burning sensation, side tenderness )      HPI  This is a new problem. Mihai Brandt is a 39 y.o. patient who presents to urgent care with c/o: dysuria for 2 days. Getting frequent UTI's. Last time mid Nov.    Has urologist appt on Pedro 10, 2025 ( first appt)   Denies fever, NVD     ROS See HPI    Allergies:     No Known Allergies    PMSFS Hx:  Past Medical History:   Diagnosis Date    Anemia     Anesthesia     N/V    Indigestion      product of in vitro fertilization (IVF) pregnancy         Pain     lower back pain    PCOD (polycystic ovarian disease)     HAD LAPROSCOPIC SURGERY     Pregnant     Thyroid disease      Past Surgical History:   Procedure Laterality Date    REPEAT C SECTION  2016    Procedure: REPEAT C SECTION;  Surgeon: Alma Centeno M.D.;  Location: LABOR AND DELIVERY;  Service:     PRIMARY C SECTION      2015     Family History   Problem Relation Age of Onset    Heart Disease Father     Hypertension Father     Heart Disease Paternal Grandfather      Social History     Tobacco Use    Smoking status: Never    Smokeless tobacco: Never   Substance Use Topics    Alcohol use: No         Problems:   Patient Active Problem List   Diagnosis    History of PCOS    Hypothyroidism    Acne vulgaris    Urinary tract infection affecting care of mother in third trimester, antepartum       Medications:   Current Outpatient Medications on File Prior to Visit   Medication Sig Dispense Refill    levothyroxine (SYNTHROID) 88 MCG Tab Take 1 Tablet by mouth every morning on an empty stomach. 90 Tablet 3    ibuprofen (MOTRIN) 200 MG Tab Take 200 mg by mouth every 6 hours as needed. (Patient not taking: Reported on 2025)      triamcinolone acetonide (KENALOG) 0.1 % Cream Apply thin layer  "to affected area twice daily for up to 2 weeks (Patient not taking: Reported on 1/2/2025) 45 g 0    fluconazole (DIFLUCAN) 150 MG tablet Take 1 tab po now. Take 2nd tab po in 72 hours. (Patient not taking: Reported on 1/2/2025) 2 Tablet 1    Cholecalciferol (VITAMIN D-3) 125 MCG (5000 UT) Tab  (Patient not taking: Reported on 1/2/2025)      Omega 3 1000 MG Cap Take by oral route. (Patient not taking: Reported on 1/2/2025)       No current facility-administered medications on file prior to visit.        Objective:     /62 (BP Location: Left arm, Patient Position: Sitting, BP Cuff Size: Adult)   Pulse 70   Temp 36.7 °C (98.1 °F) (Temporal)   Resp 16   Ht 1.676 m (5' 6\")   Wt 73.9 kg (163 lb)   LMP 11/22/2024   SpO2 98%   BMI 26.31 kg/m²     Physical Exam  Vitals and nursing note reviewed.   Constitutional:       General: She is not in acute distress.     Appearance: Normal appearance. She is well-developed. She is not ill-appearing or toxic-appearing.   HENT:      Mouth/Throat:      Mouth: Mucous membranes are moist.   Cardiovascular:      Rate and Rhythm: Normal rate and regular rhythm.      Pulses: Normal pulses.      Heart sounds: Normal heart sounds.   Pulmonary:      Effort: Pulmonary effort is normal.      Breath sounds: Normal breath sounds.   Abdominal:      Palpations: Abdomen is soft. Abdomen is not rigid.      Tenderness: There is no right CVA tenderness (mild) or left CVA tenderness.   Musculoskeletal:      Lumbar back: Normal.   Skin:     General: Skin is warm and dry.      Capillary Refill: Capillary refill takes less than 2 seconds.   Neurological:      Mental Status: She is alert and oriented to person, place, and time.   Psychiatric:         Mood and Affect: Mood normal.         Behavior: Behavior normal. Behavior is cooperative.       Results for orders placed or performed in visit on 01/02/25   POCT Urinalysis    Collection Time: 01/02/25 12:54 PM   Result Value Ref Range    POC Color " YELLOW Negative    POC Appearance CLOUDY Negative    POC Glucose NEG Negative mg/dL    POC Bilirubin NEG Negative mg/dL    POC Ketones NEG Negative mg/dL    POC Specific Gravity 1.025 <1.005 - >1.030    POC Blood SMALL Negative    POC Urine PH 5.5 5.0 - 8.0    POC Protein 30 Negative mg/dL    POC Urobiligen 0.2 Negative (0.2) mg/dL    POC Nitrites POS Negative    POC Leukocyte Esterase SMALL Negative   POCT PREGNANCY    Collection Time: 01/02/25 12:54 PM   Result Value Ref Range    POC Urine Pregnancy Test Negative     Internal Control Positive Positive     Internal Control Negative Negative      Urine culture from 12/12/24  Component 3 wk ago   Significant Indicator POS Positive (POS)   Source UR   Site -   Culture Result - Abnormal    Culture Result  Abnormal   Escherichia coli  >100,000 cfu/mL    Resulting Agency [facility]        Susceptibility     Escherichia coli     [facility]     Amoxicillin/CA >16/8 mcg/mL Resistant     Ampicillin >16 mcg/mL Resistant     Ampicillin/sulbactam >16/8 mcg/mL Resistant     Cefazolin >16 mcg/mL Resistant 1     Cefepime <=2 mcg/mL Sensitive     Ceftriaxone <=1 mcg/mL Sensitive     Cefuroxime >16 mcg/mL Resistant     Ciprofloxacin >2 mcg/mL Resistant     Gentamicin <=2 mcg/mL Sensitive     Levofloxacin >4 mcg/mL Resistant     Meropenem <=1 mcg/mL Sensitive     Meropenem/Vaborbactam <=2 mcg/mL Sensitive     Minocycline <=4 mcg/mL Sensitive     Nitrofurantoin <=32 mcg/mL Sensitive     Pip/Tazobactam <=8 mcg/mL Sensitive     Tigecycline <=2 mcg/mL Sensitive     Tobramycin <=2 mcg/mL Sensitive     Trimeth/Sulfa >2/38 mcg/mL Resistant                     Assessment /Associated Orders:      1. Acute cystitis with hematuria  Urine Culture    nitrofurantoin (MACROBID) 100 MG Cap      2. Dysuria  POCT Urinalysis    POCT PREGNANCY    phenazopyridine (PYRIDIUM) 200 MG Tab      3. Frequent UTI              Medical Decision Making:    Mihai Brandt is a very pleasant 39 y.o. female who is clinically  stable at today's acute urgent care visit. Presents with acute problem/ concern today.    No acute distress is noted at the time of the visit.  VSS. Appropriate for outpatient care at this time.  Previous urine culture showed multiple drug resistance E. coli.  It was sensitive to nitrofurantoin which was used today to treat her current urinary tract infection.  She was recommended to continue her appointment with urology as it is scheduled.  UA: pos  HCG: neg  Educated in proper administration of  prescription medication(s) ordered today including safety, possible SE, risks, benefits, rationale and alternatives to therapy.   Stay well hydrated  Urine culture: pending     Through shared decision making a discussion of the Dx and DDx, management options (risks,benefits, and alternatives to planned treatment), natural progression and supportive care.  Expressed understanding and the treatment plan was agreed upon.   Questions were encouraged and answered     Follow Up:   Return to urgent care prn if new or worsening sx or if there is no improvement in condition prn.    Educated in Red flags and indications to immediately call 911 or present to the Emergency Department.             Please note that this dictation was created using voice recognition software. I have worked with consultants from the vendor as well as technical experts from [facility] to optimize the interface. I have made every reasonable attempt to correct obvious errors, but I expect that there are errors of grammar and possibly content that I did not discover before finalizing the note.  This note was electronically signed by provider

## 2025-01-10 ENCOUNTER — TELEPHONE (OUTPATIENT)
Dept: UROLOGY | Facility: MEDICAL CENTER | Age: 40
End: 2025-01-10

## 2025-01-10 ENCOUNTER — OFFICE VISIT (OUTPATIENT)
Dept: UROLOGY | Facility: MEDICAL CENTER | Age: 40
End: 2025-01-10
Payer: COMMERCIAL

## 2025-01-10 ENCOUNTER — HOSPITAL ENCOUNTER (OUTPATIENT)
Facility: MEDICAL CENTER | Age: 40
End: 2025-01-10
Attending: PHYSICIAN ASSISTANT
Payer: COMMERCIAL

## 2025-01-10 DIAGNOSIS — N30.00 ACUTE CYSTITIS WITHOUT HEMATURIA: ICD-10-CM

## 2025-01-10 DIAGNOSIS — O23.43 URINARY TRACT INFECTION AFFECTING CARE OF MOTHER IN THIRD TRIMESTER, ANTEPARTUM: ICD-10-CM

## 2025-01-10 DIAGNOSIS — Z87.440 HISTORY OF RECURRENT UTIS: ICD-10-CM

## 2025-01-10 DIAGNOSIS — Z87.440 HISTORY OF RECURRENT UTIS: Primary | ICD-10-CM

## 2025-01-10 LAB
APPEARANCE UR: ABNORMAL
APPEARANCE UR: NORMAL
BACTERIA #/AREA URNS HPF: ABNORMAL /HPF
BILIRUB UR QL STRIP.AUTO: NEGATIVE
BILIRUB UR STRIP-MCNC: NORMAL MG/DL
CASTS URNS QL MICRO: ABNORMAL /LPF (ref 0–2)
COLOR UR AUTO: YELLOW
COLOR UR: YELLOW
EPITHELIAL CELLS 1715: ABNORMAL /HPF (ref 0–5)
GLUCOSE UR STRIP.AUTO-MCNC: NEGATIVE MG/DL
GLUCOSE UR STRIP.AUTO-MCNC: NORMAL MG/DL
KETONES UR STRIP.AUTO-MCNC: NEGATIVE MG/DL
KETONES UR STRIP.AUTO-MCNC: NORMAL MG/DL
LEUKOCYTE ESTERASE UR QL STRIP.AUTO: ABNORMAL
LEUKOCYTE ESTERASE UR QL STRIP.AUTO: NORMAL
MICRO URNS: ABNORMAL
NITRITE UR QL STRIP.AUTO: NORMAL
NITRITE UR QL STRIP.AUTO: POSITIVE
PH UR STRIP.AUTO: 7.5 [PH] (ref 5–8)
PH UR STRIP.AUTO: 7.5 [PH] (ref 5–8)
POC POST-VOID: 73 ML
POC PRE-VOID: NORMAL
PROT UR QL STRIP: NEGATIVE MG/DL
PROT UR QL STRIP: NORMAL MG/DL
RBC # URNS HPF: ABNORMAL /HPF (ref 0–2)
RBC UR QL AUTO: ABNORMAL
RBC UR QL AUTO: NORMAL
SP GR UR STRIP.AUTO: 1.01
SP GR UR STRIP.AUTO: 1.02
UROBILINOGEN UR STRIP-MCNC: 0.2 MG/DL
UROBILINOGEN UR STRIP.AUTO-MCNC: 1 EU/DL
WBC #/AREA URNS HPF: >100 /HPF

## 2025-01-10 PROCEDURE — 87186 SC STD MICRODIL/AGAR DIL: CPT

## 2025-01-10 PROCEDURE — 81001 URINALYSIS AUTO W/SCOPE: CPT

## 2025-01-10 PROCEDURE — 81002 URINALYSIS NONAUTO W/O SCOPE: CPT | Performed by: PHYSICIAN ASSISTANT

## 2025-01-10 PROCEDURE — 87086 URINE CULTURE/COLONY COUNT: CPT

## 2025-01-10 PROCEDURE — 51798 US URINE CAPACITY MEASURE: CPT | Performed by: PHYSICIAN ASSISTANT

## 2025-01-10 PROCEDURE — 87077 CULTURE AEROBIC IDENTIFY: CPT

## 2025-01-10 PROCEDURE — 99244 OFF/OP CNSLTJ NEW/EST MOD 40: CPT | Performed by: PHYSICIAN ASSISTANT

## 2025-01-10 RX ORDER — CEFPODOXIME PROXETIL 200 MG/1
200 TABLET, FILM COATED ORAL 2 TIMES DAILY
Qty: 20 TABLET | Refills: 0 | Status: SHIPPED
Start: 2025-01-10 | End: 2025-01-20

## 2025-01-10 RX ORDER — CEFPODOXIME PROXETIL 200 MG/1
200 TABLET, FILM COATED ORAL 2 TIMES DAILY
Qty: 20 TABLET | Refills: 0 | Status: SHIPPED | OUTPATIENT
Start: 2025-01-10 | End: 2025-01-10 | Stop reason: SDUPTHER

## 2025-01-10 RX ORDER — NITROFURANTOIN 25; 75 MG/1; MG/1
100 CAPSULE ORAL PRN
Qty: 30 CAPSULE | Refills: 2 | Status: SHIPPED | OUTPATIENT
Start: 2025-01-10 | End: 2025-01-10

## 2025-01-10 RX ORDER — NITROFURANTOIN 25; 75 MG/1; MG/1
100 CAPSULE ORAL PRN
Qty: 30 CAPSULE | Refills: 2 | Status: SHIPPED | OUTPATIENT
Start: 2025-01-10

## 2025-01-10 NOTE — TELEPHONE ENCOUNTER
Patient called and stated that [facility] pharmacy does not have the Cefpodoxime available. Patient called [facility] and they have it there, however, pharmacy is requesting a new rx to be sent there. Please advise.

## 2025-01-10 NOTE — PATIENT INSTRUCTIONS
"PREVENTING BLADDER INFECTIONS (UTI'S)     Empty the bladder completely every time you urinate. Double void if necessary.   Empty the bladder immediately after sexual intercourse without exception.?If prescribed by your provider, take one low-dose antibiotic immediately?after sexual intercourse.   Empty the bladder every 2-3 hours during the day and at least once at night whether or not you have the urge to urinate. Do not delay urination.    Drink plenty of fluids. ?48 - 64 ounces per day is recommended.   Take Vitamin C 500 mg twice a day. The Vitamin C will acidify your urine and help to prevent bladder infections (UTI's).   Take cranberry extract pills.?Cranberry contains an anti-oxidant that can help to prevent bladder infections (UTI's). Ellura is a cranberry product that has been studied and shown to help reduce UTI's. You can find more information regarding Ellura online at [email or web address]. Follow the package directions.     Take a probiotic.?Follow the package directions. This will replenish the \"good\" bacteria to help prevent infections.   Take your prescribed dose of prophylactic antibiotic immediately before or after sexual intercourse    "

## 2025-01-10 NOTE — PROGRESS NOTES
Reason for visit:   Recurrent UTIs    HPI   Mihai Brandt is a 39 y.o. female presenting with recurrent UTIs that have persisted since 2019. She was previously followed by Dr Mcneil from [facility] at this time and he prescribed an on demand course of Bactrim. She did not end up using the bactrim, but did feel that recurrence resolved temporarily. Most recently, she gave birth in 2024 and she has had recurrent episodes of culture proven E. Coli UTI. Most recent cultures have shown multidrug resistance.     At the time of UTI she develops dysuria, SP pressure and urinary frequency. At times she will also develop flank pressure. She is typically prescribed antibiotics and symptoms resolve temporarily, but then return about a week or two later. She feels that UTI is related to sexual intercourse.     She has tried cranberry tablets and D-Mannose previously. She is not currently taking either.    She denies any history of kidney stones. She denies any family history of  CA. She is a lifelong non-smoker. She endorses adequate daily water intake.     Of note, she has a history of PCOS and endometriosis.     PVR: 73 mL    Past Medical History:   Diagnosis Date    Anemia     Anesthesia     N/V    Indigestion      product of in vitro fertilization (IVF) pregnancy         Pain     lower back pain    PCOD (polycystic ovarian disease)     HAD LAPROSCOPIC SURGERY     Pregnant     Thyroid disease             Past Surgical History:   Procedure Laterality Date    REPEAT C SECTION  2016    Procedure: REPEAT C SECTION;  Surgeon: Alma Centeno M.D.;  Location: LABOR AND DELIVERY;  Service:     PRIMARY C SECTION      2015       Urologic PMH:    Recurrent UTIs     Family History:   Grandparent's -- kidney stones  Possible stones with mother     Social History     Socioeconomic History    Marital status:      Spouse name: Not on file    Number of children: Not on file    Years of  education: Not on file    Highest education level: Not on file   Occupational History    Not on file   Tobacco Use    Smoking status: Never    Smokeless tobacco: Never   Vaping Use    Vaping status: Never Used   Substance and Sexual Activity    Alcohol use: No    Drug use: No    Sexual activity: Yes     Partners: Male     Birth control/protection: Condom   Other Topics Concern    Not on file   Social History Narrative    Not on file     Social Drivers of Health     Financial Resource Strain: Not on file   Food Insecurity: Not on file   Transportation Needs: Not on file   Physical Activity: Not on file   Stress: Not on file   Social Connections: Not on file   Intimate Partner Violence: Not on file   Housing Stability: Not on file         No Known Allergies      Current Outpatient Medications   Medication Sig Dispense Refill    phenazopyridine (PYRIDIUM) 200 MG Tab Take 1 Tablet by mouth 3 times a day as needed for Moderate Pain. 6 Tablet 0    levothyroxine (SYNTHROID) 88 MCG Tab Take 1 Tablet by mouth every morning on an empty stomach. 90 Tablet 3     No current facility-administered medications for this visit.         Review of Systems:   GENERAL: No fever, chills, nausea, vomiting   RESPIRATORY: No shortness of breath, wheezing, or cough   CARDIAC: No chest pain, palpitations, irregular heart rhythm, or chest pressure   GASTROINTESTINAL: No abdominal pain or distention    GENITROURINARY: See HPI   NEUROLOGIC: No weakness, blackouts, seizure or stroke   HEMATOLOGIC: No history of bleeding disorders, hypercoagulability, DVT, or PE   ENDOCRINE: No history of diabetes, adrenal disorders, or thyroid disorders    All other review of systems was negative     There were no vitals filed for this visit.      Physical Exam:   General:  Alert & Oriented, NAD   Skin: Warm/Dry, no evidence of infection or rash   HEENT: normocephalic     Assessment and plan:     Recurrent UTIs    -- PVR reviewed and patient is emptying her  "bladder well  -- POCT urinalysis reviewed and is nitrite positive with moderate leuks and trace blood -- will send for culture  -- Cefpodoxime 200 mg BID x10 days prescribed -- will call patient if change in antibiotic is needed due to final sensitivities  -- Encouraged complete bladder emptying with double voiding if necessary and avoidance of delaying urination  -- Start vitamin C 500 mg BID to acidify the urine to prevent UTI\"s   -- Consider supplementation with cranberry extract pills and/or probiotics   -- Start post-coital antibiotic prophylaxis after finishing treatment course of antibiotics -- macrobid 100 mg PRN sexual intercourse was prescribed  -- Adequate daily water intake of at least 48 - 64 ounces   -- Advised to call the office with any future UTI symptoms to be directed to drop off a urine specimen prior to receiving antibiotics   -- Obtain an US kidneys and bladder for baseline UT imaging  -- Follow-up in 3 months for review       I personally reviewed the patient's pertinent medical records and labs/images available to me.     Fede Ray PA-C   "

## 2025-01-14 ENCOUNTER — TELEPHONE (OUTPATIENT)
Dept: UROLOGY | Facility: MEDICAL CENTER | Age: 40
End: 2025-01-14
Payer: COMMERCIAL

## 2025-01-20 ENCOUNTER — OFFICE VISIT (OUTPATIENT)
Dept: MEDICAL GROUP | Facility: LAB | Age: 40
End: 2025-01-20
Payer: COMMERCIAL

## 2025-01-20 VITALS
SYSTOLIC BLOOD PRESSURE: 96 MMHG | RESPIRATION RATE: 16 BRPM | OXYGEN SATURATION: 96 % | HEART RATE: 75 BPM | TEMPERATURE: 97.6 F | BODY MASS INDEX: 25.39 KG/M2 | WEIGHT: 158 LBS | DIASTOLIC BLOOD PRESSURE: 60 MMHG | HEIGHT: 66 IN

## 2025-01-20 DIAGNOSIS — G89.29 CHRONIC NECK PAIN: ICD-10-CM

## 2025-01-20 DIAGNOSIS — M54.42 CHRONIC BILATERAL LOW BACK PAIN WITH LEFT-SIDED SCIATICA: ICD-10-CM

## 2025-01-20 DIAGNOSIS — M54.2 CHRONIC NECK PAIN: ICD-10-CM

## 2025-01-20 DIAGNOSIS — G89.29 CHRONIC BILATERAL LOW BACK PAIN WITH LEFT-SIDED SCIATICA: ICD-10-CM

## 2025-01-20 PROCEDURE — 99214 OFFICE O/P EST MOD 30 MIN: CPT | Mod: 25 | Performed by: FAMILY MEDICINE

## 2025-01-20 PROCEDURE — 96372 THER/PROPH/DIAG INJ SC/IM: CPT | Performed by: FAMILY MEDICINE

## 2025-01-20 RX ORDER — KETOROLAC TROMETHAMINE 30 MG/ML
30 INJECTION, SOLUTION INTRAMUSCULAR; INTRAVENOUS ONCE
Status: DISCONTINUED | OUTPATIENT
Start: 2025-01-20 | End: 2025-01-20

## 2025-01-20 RX ORDER — KETOROLAC TROMETHAMINE 15 MG/ML
15 INJECTION, SOLUTION INTRAMUSCULAR; INTRAVENOUS ONCE
Status: COMPLETED | OUTPATIENT
Start: 2025-01-20 | End: 2025-01-20

## 2025-01-20 RX ADMIN — KETOROLAC TROMETHAMINE 15 MG: 15 INJECTION, SOLUTION INTRAMUSCULAR; INTRAVENOUS at 15:34

## 2025-01-20 ASSESSMENT — FIBROSIS 4 INDEX: FIB4 SCORE: 1.07

## 2025-01-20 NOTE — PROGRESS NOTES
Verbal consent was acquired by the patient to use [place] ambient listening note generation during this visit Yes    Subjective:   Mihai Brandt is a 39 y.o. female here today for   Chief Complaint   Patient presents with    Back Pain     History of Present Illness  The patient is a 39-year-old female who presents today to discuss chronic and ongoing back pain. She was last seen in the bathroom style.    She reports experiencing intermittent left shoulder, neck, and upper back pain, as well as pain in her leg below the knee, for over 2 months. The pain is described as a constant, dull ache that is most severe upon waking and intensifies towards the end of the day. The pain radiates from her back down to her leg, particularly at night. She also experiences occasional radiating pain through her hip and buttock. She does not experience any associated weakness or numbness in her legs or feet. She has not had any recent injuries or falls. The onset of these symptoms was approximately 2 months postpartum, following a  delivery in 2024. She attributes her symptoms to weight gain and decreased physical activity post-. She also reports that certain movements, such as bending down or lifting, exacerbate her pain. She has been using a massage gun for relief, which provides temporary comfort for about 30 minutes. She has been taking ibuprofen intermittently, with the last dose taken on Friday night. She believes her pain is due to muscle strain and inflammation. Resting and lying down alleviate her symptoms, but standing exacerbates them. Despite attempts at self-management with heating pads, ice, and over-the-counter ibuprofen, she has been unable to walk for over a week due to severe morning stiffness and pain. She has a history of back issues following the birth of her son 8 years ago, which were managed with chiropractic care, exercise, yoga, and weight loss.    MEDICATIONS  ibuprofen      No  "Known Allergies      Current medicines (including changes today)  Current Outpatient Medications   Medication Sig Dispense Refill    nitrofurantoin (MACROBID) 100 MG Cap Take 1 Capsule by mouth as needed (At the time of sexual intercourse). Max daily dose is 2 tablets. 30 Capsule 2    cefpodoxime (VANTIN) 200 MG Tab Take 1 Tablet by mouth 2 times a day. 20 Tablet 0    levothyroxine (SYNTHROID) 88 MCG Tab Take 1 Tablet by mouth every morning on an empty stomach. 90 Tablet 3    phenazopyridine (PYRIDIUM) 200 MG Tab Take 1 Tablet by mouth 3 times a day as needed for Moderate Pain. 6 Tablet 0     No current facility-administered medications for this visit.     She  has a past medical history of Anemia, Anesthesia, Indigestion, [place] product of in vitro fertilization (IVF) pregnancy, Pain, PCOD (polycystic ovarian disease), Pregnant, and Thyroid disease.    ROS   ROS  -See HPI     Objective:     Physical Exam:  BP 96/60   Pulse 75   Temp 36.4 °C (97.6 °F) (Temporal)   Resp 16   Ht 1.676 m (5' 5.98\")   Wt 71.7 kg (158 lb)   SpO2 96%  Body mass index is 25.51 kg/m².   Const: Vitals above. Well-appearing.  CV: Palpated for bilateral upper extremity radial/ulnar pulses, noting below if not 2+, or if abnormal Adson's test.  Skin: Inspected for rash or lesions in area of concern.  Neuro/psych: Reflexes at bilateral biceps (C5), brachioradialis (C6), triceps (C7) evaluated: 2+. Sensation to light touch evaluated bilaterally over deltoid area (C5, axillary nerve), lateral forearm (C6, musculocutaneous nerve), middle finger (C7), medial forearm (C8), and medial arm (T1). Evaluated for scapular winging. Observed for normal mood/affect/insight.  MSK: Gait and posture evaluated.  Examination of neck:  - Inspected/palpated the following for tenderness/deformity/asymmetry/atrophy: occiput, spinous processes, facets, paraspinous muscles, trapezius, rhomboids, deltoids, and pectoralis muscles. Spurling test evaluated.  - " Assessed passive/active range of motion in neck flexion, extension, bilateral rotation, and bilateral tilting, noting below for any pain.  - Assessed muscle strength in neck flexion/extension, bilateral rotation, and bilateral tilting, noting below for any muscle atrophy or strength less than 5/5 or pain.  - Assessed neck stability with evaluation for any abnormal vertebral motion or step-offs, and any pain or relief with distraction test and compression test.  Examination of bilateral upper extremities:  - Assessed muscle strength bilaterally with shoulder abduction (C5, axillary nerve), elbow flexion (C5, musculocutaneous nerve), wrist extension (C6, radial nerve), finger extension (C7, radial nerve), wrist flexion (C7, median/ulnar nerves), finger flexion (C8, median/ulnar nerves), finger abduction (T1, ulnar nerve), noting below if less than 5/5, or if muscle atrophy is present.    All of the above were found to be normal, except:  -Pain with flexion of neck, pain with rotation and sidebending, worse when movement is to the right.    Lumbar back:  : Prostate exam (male) or pelvic exam (female): n/a.  Skin: Evaluated low back, lower abdomen, bilateral legs for rash or lesions.  Neuro/psych: Reflexes at bilateral patella (L4), Achilles (S1) evaluated. Sensation to light touch evaluated at medial thigh (L3), medial leg/foot (L4), lateral leg/dorsal foot (L5), and lateral foot (S1). Saddle area: unremarkable. Straight leg raise done in sitting/supine/prone position. Observed for normal mood/affect/insight.  MSK: Gait and posture evaluated. Examination of spine/pelvis:   - Inspected/palpated for spinal/pelvic alignment, scoliosis, lumbar lordosis, and leg length discrepancy. The following were palpated for pain/tenderness: spinous processes, transverse processes, facet joints, SI joints, ASIS, PSIS, iliac crest, coccyx, ischial tuberosity, sciatic notch. Evaluated SI compression, JAZMYNE, stork maneuver for  tenderness.   - Assessed range of motion with lumbar flexion, extension, bilateral side bending, bilateral rotation, and piriformis stretch, noting below for any pain. - Assessed paraspinous muscle tone.   - Assessed stability with evaluation for abnormal SI motion and vertebral step-offs. Examination of bilateral lower extremities/hips:   - Palpated bilateral greater trochanters. Evaluated FADIR.   - Assessed muscle strength with hip flexion, knee extension (L2-4, femoral nerve), knee flexion (L5/S1, sciatic nerve), heel walk (L5), toe walk (S1), repetitive heel raises (S1).     All of the above were found to be normal, except:  -Pain with flexion of back, relieved the pain with extension.  Relief of pain with twisting and side bend.  Tenderness palpation of the paraspinal muscles on the left side.  No midline tenderness.       Results      Assessment and Plan:     Assessment & Plan  1. Chronic neck and back pain.  The etiology of the pain is likely muscular in nature, potentially exacerbated by chronic changes. A comprehensive rehabilitation program will be initiated, including stretching exercises and physical therapy. She is advised to continue with her current regimen of heat application and massage. A series of exercises have been provided for her to perform 2 to 3 times daily, focusing on range of motion and isometric holds. She is also encouraged to incorporate yoga into her routine. Radiographic imaging of the neck and back will be obtained to rule out any underlying pathology. A referral to [facility] has been made. A Toradol injection will be administered today for immediate relief.    PROCEDURE  A Toradol injection was administered today for immediate relief.    Orders:  1. Chronic neck pain  - Referral to Physical Therapy  - DX-CERVICAL SPINE-2 OR 3 VIEWS; Future  - ketorolac (Toradol) injection 30 mg    2. Chronic bilateral low back pain with left-sided sciatica  - Referral to  Physical Therapy  - DX-LUMBAR SPINE-2 OR 3 VIEWS; Future  - ketorolac (Toradol) injection 30 mg        Followup: No follow-ups on file.         PLEASE NOTE: This dictation was created using voice recognition and [employer] software. I have made every reasonable attempt to correct obvious errors, but I expect that there are errors of grammar and possibly content that I did not discover before finalizing the note.

## 2025-02-28 ENCOUNTER — APPOINTMENT (OUTPATIENT)
Dept: RADIOLOGY | Facility: MEDICAL CENTER | Age: 40
End: 2025-02-28
Attending: FAMILY MEDICINE
Payer: COMMERCIAL

## 2025-02-28 ENCOUNTER — APPOINTMENT (OUTPATIENT)
Dept: RADIOLOGY | Facility: MEDICAL CENTER | Age: 40
End: 2025-02-28
Attending: PHYSICIAN ASSISTANT
Payer: COMMERCIAL

## 2025-03-19 ENCOUNTER — APPOINTMENT (OUTPATIENT)
Dept: LAB | Facility: MEDICAL CENTER | Age: 40
End: 2025-03-19
Payer: COMMERCIAL

## 2025-03-19 ENCOUNTER — HOSPITAL ENCOUNTER (OUTPATIENT)
Facility: MEDICAL CENTER | Age: 40
End: 2025-03-19
Attending: PHYSICIAN ASSISTANT
Payer: COMMERCIAL

## 2025-03-19 DIAGNOSIS — N30.00 ACUTE CYSTITIS WITHOUT HEMATURIA: Primary | ICD-10-CM

## 2025-03-19 DIAGNOSIS — Z87.440 HISTORY OF RECURRENT UTIS: ICD-10-CM

## 2025-03-19 LAB
APPEARANCE UR: ABNORMAL
BACTERIA #/AREA URNS HPF: ABNORMAL /HPF
BILIRUB UR QL STRIP.AUTO: NEGATIVE
CASTS URNS QL MICRO: ABNORMAL /LPF (ref 0–2)
COLOR UR: ABNORMAL
EPITHELIAL CELLS 1715: ABNORMAL /HPF (ref 0–5)
GLUCOSE UR STRIP.AUTO-MCNC: NEGATIVE MG/DL
KETONES UR STRIP.AUTO-MCNC: 80 MG/DL
LEUKOCYTE ESTERASE UR QL STRIP.AUTO: ABNORMAL
MICRO URNS: ABNORMAL
NITRITE UR QL STRIP.AUTO: NEGATIVE
PH UR STRIP.AUTO: 5.5 [PH] (ref 5–8)
PROT UR QL STRIP: 30 MG/DL
RBC # URNS HPF: ABNORMAL /HPF (ref 0–2)
RBC UR QL AUTO: NEGATIVE
SP GR UR STRIP.AUTO: 1.03
UROBILINOGEN UR STRIP.AUTO-MCNC: 1 EU/DL
WBC #/AREA URNS HPF: ABNORMAL /HPF

## 2025-03-19 PROCEDURE — 87086 URINE CULTURE/COLONY COUNT: CPT

## 2025-03-19 PROCEDURE — 81001 URINALYSIS AUTO W/SCOPE: CPT

## 2025-03-20 DIAGNOSIS — N30.00 ACUTE CYSTITIS WITHOUT HEMATURIA: Primary | ICD-10-CM

## 2025-03-20 RX ORDER — CEFPODOXIME PROXETIL 200 MG/1
200 TABLET, FILM COATED ORAL 2 TIMES DAILY
Qty: 14 TABLET | Refills: 0 | Status: SHIPPED | OUTPATIENT
Start: 2025-03-20

## 2025-03-21 ENCOUNTER — RESULTS FOLLOW-UP (OUTPATIENT)
Dept: UROLOGY | Facility: MEDICAL CENTER | Age: 40
End: 2025-03-21

## 2025-03-21 LAB
BACTERIA UR CULT: NORMAL
SIGNIFICANT IND 70042: NORMAL
SITE SITE: NORMAL
SOURCE SOURCE: NORMAL

## 2025-03-26 ENCOUNTER — TELEPHONE (OUTPATIENT)
Dept: UROLOGY | Facility: MEDICAL CENTER | Age: 40
End: 2025-03-26
Payer: COMMERCIAL

## 2025-04-03 ENCOUNTER — APPOINTMENT (OUTPATIENT)
Dept: UROLOGY | Facility: MEDICAL CENTER | Age: 40
End: 2025-04-03
Payer: COMMERCIAL

## 2025-07-11 DIAGNOSIS — Z87.440 HISTORY OF RECURRENT UTIS: Primary | ICD-10-CM

## 2025-08-25 DIAGNOSIS — E03.9 HYPOTHYROIDISM, UNSPECIFIED TYPE: ICD-10-CM

## 2025-08-25 RX ORDER — LEVOTHYROXINE SODIUM 88 UG/1
88 TABLET ORAL
Qty: 90 TABLET | Refills: 3 | Status: SHIPPED | OUTPATIENT
Start: 2025-08-25